# Patient Record
Sex: FEMALE | Race: BLACK OR AFRICAN AMERICAN | NOT HISPANIC OR LATINO | URBAN - METROPOLITAN AREA
[De-identification: names, ages, dates, MRNs, and addresses within clinical notes are randomized per-mention and may not be internally consistent; named-entity substitution may affect disease eponyms.]

---

## 2022-08-08 ENCOUNTER — OUTPATIENT (OUTPATIENT)
Dept: OUTPATIENT SERVICES | Facility: HOSPITAL | Age: 72
LOS: 1 days | End: 2022-08-08
Payer: MEDICAID

## 2022-08-08 DIAGNOSIS — Z01.818 ENCOUNTER FOR OTHER PREPROCEDURAL EXAMINATION: ICD-10-CM

## 2022-08-08 LAB
ALBUMIN SERPL ELPH-MCNC: 3.7 G/DL — SIGNIFICANT CHANGE UP (ref 3.3–5)
ALP SERPL-CCNC: 223 U/L — HIGH (ref 40–120)
ALT FLD-CCNC: 8 U/L — LOW (ref 10–45)
ANION GAP SERPL CALC-SCNC: 13 MMOL/L — SIGNIFICANT CHANGE UP (ref 5–17)
APTT BLD: 28 SEC — SIGNIFICANT CHANGE UP (ref 27.5–35.5)
AST SERPL-CCNC: 12 U/L — SIGNIFICANT CHANGE UP (ref 10–40)
BILIRUB SERPL-MCNC: 0.2 MG/DL — SIGNIFICANT CHANGE UP (ref 0.2–1.2)
BLD GP AB SCN SERPL QL: NEGATIVE — SIGNIFICANT CHANGE UP
BLD GP AB SCN SERPL QL: NEGATIVE — SIGNIFICANT CHANGE UP
BUN SERPL-MCNC: 44 MG/DL — HIGH (ref 7–23)
CALCIUM SERPL-MCNC: 9 MG/DL — SIGNIFICANT CHANGE UP (ref 8.4–10.5)
CHLORIDE SERPL-SCNC: 99 MMOL/L — SIGNIFICANT CHANGE UP (ref 96–108)
CO2 SERPL-SCNC: 28 MMOL/L — SIGNIFICANT CHANGE UP (ref 22–31)
CREAT SERPL-MCNC: 4.58 MG/DL — HIGH (ref 0.5–1.3)
EGFR: 10 ML/MIN/1.73M2 — LOW
GLUCOSE SERPL-MCNC: 184 MG/DL — HIGH (ref 70–99)
HCT VFR BLD CALC: 27 % — LOW (ref 34.5–45)
HGB BLD-MCNC: 8.2 G/DL — LOW (ref 11.5–15.5)
INR BLD: 1.04 — SIGNIFICANT CHANGE UP (ref 0.88–1.16)
MCHC RBC-ENTMCNC: 28.5 PG — SIGNIFICANT CHANGE UP (ref 27–34)
MCHC RBC-ENTMCNC: 30.4 GM/DL — LOW (ref 32–36)
MCV RBC AUTO: 93.8 FL — SIGNIFICANT CHANGE UP (ref 80–100)
NRBC # BLD: 0 /100 WBCS — SIGNIFICANT CHANGE UP (ref 0–0)
PLATELET # BLD AUTO: 198 K/UL — SIGNIFICANT CHANGE UP (ref 150–400)
POTASSIUM SERPL-MCNC: 4.8 MMOL/L — SIGNIFICANT CHANGE UP (ref 3.5–5.3)
POTASSIUM SERPL-SCNC: 4.8 MMOL/L — SIGNIFICANT CHANGE UP (ref 3.5–5.3)
PROT SERPL-MCNC: 6.4 G/DL — SIGNIFICANT CHANGE UP (ref 6–8.3)
PROTHROM AB SERPL-ACNC: 12.4 SEC — SIGNIFICANT CHANGE UP (ref 10.5–13.4)
RBC # BLD: 2.88 M/UL — LOW (ref 3.8–5.2)
RBC # FLD: 16.9 % — HIGH (ref 10.3–14.5)
RH IG SCN BLD-IMP: POSITIVE — SIGNIFICANT CHANGE UP
RH IG SCN BLD-IMP: POSITIVE — SIGNIFICANT CHANGE UP
SODIUM SERPL-SCNC: 140 MMOL/L — SIGNIFICANT CHANGE UP (ref 135–145)
WBC # BLD: 5.77 K/UL — SIGNIFICANT CHANGE UP (ref 3.8–10.5)
WBC # FLD AUTO: 5.77 K/UL — SIGNIFICANT CHANGE UP (ref 3.8–10.5)

## 2022-08-08 PROCEDURE — 85730 THROMBOPLASTIN TIME PARTIAL: CPT

## 2022-08-08 PROCEDURE — 86901 BLOOD TYPING SEROLOGIC RH(D): CPT

## 2022-08-08 PROCEDURE — 86900 BLOOD TYPING SEROLOGIC ABO: CPT

## 2022-08-08 PROCEDURE — 85027 COMPLETE CBC AUTOMATED: CPT

## 2022-08-08 PROCEDURE — 85610 PROTHROMBIN TIME: CPT

## 2022-08-08 PROCEDURE — 86850 RBC ANTIBODY SCREEN: CPT

## 2022-08-08 PROCEDURE — 80053 COMPREHEN METABOLIC PANEL: CPT

## 2022-08-10 RX ORDER — CHLORHEXIDINE GLUCONATE 213 G/1000ML
1 SOLUTION TOPICAL DAILY
Refills: 0 | Status: DISCONTINUED | OUTPATIENT
Start: 2022-08-11 | End: 2022-08-11

## 2022-08-10 NOTE — ASU PATIENT PROFILE, ADULT - NSICDXPASTMEDICALHX_GEN_ALL_CORE_FT
PAST MEDICAL HISTORY:  Angina pectoris     CAD (coronary artery disease)     DM (diabetes mellitus)     ESRD on hemodialysis right chest porth    HTN (hypertension)      PAST MEDICAL HISTORY:  Angina pectoris     AV fistula     CAD (coronary artery disease)     DM (diabetes mellitus)     ESRD on hemodialysis right chest porth    HTN (hypertension)     Seizures

## 2022-08-10 NOTE — ASU PATIENT PROFILE, ADULT - NS PREOP UNDERSTANDS INFO
yes  leave valuables/jewelry/contacts at home, make sure to have escort 18+, bring photo ID, insurance card + covid vax card, no solid foods after midnight, water/apple juice/gatorade until 08:30

## 2022-08-10 NOTE — ASU PATIENT PROFILE, ADULT - FALL HARM RISK - UNIVERSAL INTERVENTIONS
Bed in lowest position, wheels locked, appropriate side rails in place/Call bell, personal items and telephone in reach/Instruct patient to call for assistance before getting out of bed or chair/Non-slip footwear when patient is out of bed/[place] to call system/Physically safe environment - no spills, clutter or unnecessary equipment/Purposeful Proactive Rounding/Room/bathroom lighting operational, light cord in reach

## 2022-08-10 NOTE — ASU PATIENT PROFILE, ADULT - NSICDXPASTSURGICALHX_GEN_ALL_CORE_FT
PAST SURGICAL HISTORY:  AV fistula     Cataract     H/O cervical spine surgery     H/O  section     History of loop recorder     History of thyroid surgery     Status post primary angioplasty

## 2022-08-11 ENCOUNTER — OUTPATIENT (OUTPATIENT)
Dept: OUTPATIENT SERVICES | Facility: HOSPITAL | Age: 72
LOS: 1 days | Discharge: HOME | End: 2022-08-11

## 2022-08-11 VITALS
RESPIRATION RATE: 16 BRPM | DIASTOLIC BLOOD PRESSURE: 51 MMHG | HEIGHT: 63 IN | WEIGHT: 159.39 LBS | TEMPERATURE: 97 F | OXYGEN SATURATION: 100 % | SYSTOLIC BLOOD PRESSURE: 158 MMHG | HEART RATE: 60 BPM

## 2022-08-11 VITALS
SYSTOLIC BLOOD PRESSURE: 132 MMHG | DIASTOLIC BLOOD PRESSURE: 63 MMHG | OXYGEN SATURATION: 100 % | HEART RATE: 60 BPM | RESPIRATION RATE: 16 BRPM

## 2022-08-11 DIAGNOSIS — I77.0 ARTERIOVENOUS FISTULA, ACQUIRED: Chronic | ICD-10-CM

## 2022-08-11 DIAGNOSIS — Z98.890 OTHER SPECIFIED POSTPROCEDURAL STATES: Chronic | ICD-10-CM

## 2022-08-11 DIAGNOSIS — Z98.891 HISTORY OF UTERINE SCAR FROM PREVIOUS SURGERY: Chronic | ICD-10-CM

## 2022-08-11 DIAGNOSIS — H26.9 UNSPECIFIED CATARACT: Chronic | ICD-10-CM

## 2022-08-11 LAB
ALBUMIN SERPL ELPH-MCNC: 3.2 G/DL — LOW (ref 3.3–5)
ALP SERPL-CCNC: 190 U/L — HIGH (ref 40–120)
ALT FLD-CCNC: 15 U/L — SIGNIFICANT CHANGE UP (ref 10–45)
ANION GAP SERPL CALC-SCNC: 13 MMOL/L — SIGNIFICANT CHANGE UP (ref 5–17)
AST SERPL-CCNC: 20 U/L — SIGNIFICANT CHANGE UP (ref 10–40)
BILIRUB SERPL-MCNC: 0.5 MG/DL — SIGNIFICANT CHANGE UP (ref 0.2–1.2)
BUN SERPL-MCNC: 26 MG/DL — HIGH (ref 7–23)
CALCIUM SERPL-MCNC: 9.9 MG/DL — SIGNIFICANT CHANGE UP (ref 8.4–10.5)
CHLORIDE SERPL-SCNC: 98 MMOL/L — SIGNIFICANT CHANGE UP (ref 96–108)
CO2 SERPL-SCNC: 29 MMOL/L — SIGNIFICANT CHANGE UP (ref 22–31)
CREAT SERPL-MCNC: 3.2 MG/DL — HIGH (ref 0.5–1.3)
EGFR: 15 ML/MIN/1.73M2 — LOW
GLUCOSE BLDC GLUCOMTR-MCNC: 98 MG/DL — SIGNIFICANT CHANGE UP (ref 70–99)
GLUCOSE SERPL-MCNC: 141 MG/DL — HIGH (ref 70–99)
POTASSIUM SERPL-MCNC: 4.6 MMOL/L — SIGNIFICANT CHANGE UP (ref 3.5–5.3)
POTASSIUM SERPL-SCNC: 4.6 MMOL/L — SIGNIFICANT CHANGE UP (ref 3.5–5.3)
PROT SERPL-MCNC: 6.4 G/DL — SIGNIFICANT CHANGE UP (ref 6–8.3)
SODIUM SERPL-SCNC: 140 MMOL/L — SIGNIFICANT CHANGE UP (ref 135–145)

## 2022-08-11 DEVICE — CLIP APPLIER ETHICON LIGACLIP 11.5" MEDIUM: Type: IMPLANTABLE DEVICE | Status: FUNCTIONAL

## 2022-08-11 DEVICE — CLIP APPLIER ETHICON LIGACLIP 9 3/8" SMALL: Type: IMPLANTABLE DEVICE | Status: FUNCTIONAL

## 2022-08-11 DEVICE — SURGICEL FIBRILLAR 4 X 4": Type: IMPLANTABLE DEVICE | Status: FUNCTIONAL

## 2022-08-11 RX ORDER — CHLORHEXIDINE GLUCONATE 213 G/1000ML
1 SOLUTION TOPICAL
Qty: 0 | Refills: 0 | DISCHARGE
Start: 2022-08-11

## 2022-08-11 RX ORDER — NIFEDIPINE 30 MG
0 TABLET, EXTENDED RELEASE 24 HR ORAL
Qty: 0 | Refills: 0 | DISCHARGE

## 2022-08-11 RX ORDER — SODIUM CHLORIDE 9 MG/ML
1000 INJECTION INTRAMUSCULAR; INTRAVENOUS; SUBCUTANEOUS
Refills: 0 | Status: DISCONTINUED | OUTPATIENT
Start: 2022-08-11 | End: 2022-08-11

## 2022-08-11 RX ORDER — ACETAMINOPHEN 500 MG
650 TABLET ORAL ONCE
Refills: 0 | Status: DISCONTINUED | OUTPATIENT
Start: 2022-08-11 | End: 2022-08-11

## 2022-08-11 RX ADMIN — CHLORHEXIDINE GLUCONATE 1 APPLICATION(S): 213 SOLUTION TOPICAL at 11:55

## 2022-08-11 RX ADMIN — SODIUM CHLORIDE 3 MILLILITER(S): 9 INJECTION INTRAMUSCULAR; INTRAVENOUS; SUBCUTANEOUS at 18:22

## 2022-08-11 NOTE — ASU DISCHARGE PLAN (ADULT/PEDIATRIC) - ASU DC SPECIAL INSTRUCTIONSFT
1. See Dr. Marquez in the office tomorrow, 12 August 2022.   2. Do NOT take heparin with dialysis.   3. See written instructions as per Dr. Marquez's office.

## 2022-08-11 NOTE — ASU DISCHARGE PLAN (ADULT/PEDIATRIC) - NS MD DC FALL RISK RISK
For information on Fall & Injury Prevention, visit: [email or web address] OR  [email or web address] OR  https://www.cdc.gov/steadi/patient.html

## 2022-08-11 NOTE — PRE-ANESTHESIA EVALUATION ADULT - NSANTHADDINFOFT_GEN_ALL_CORE
Pt accepts all anesthesia risks IBNLT: Dental, Pharyngeal, Laryngeal, Tracheal Trauma, Sore Throat, Hoarseness, Recurrent Laryngeal Nerve Dysfunction, Upper and Lower Extremity Paresthesias, Nausea and Vomiting  For regional pt accepts risk of LA toxicity, Intravascular injection, Intraneuronal injection

## 2022-08-11 NOTE — ASU DISCHARGE PLAN (ADULT/PEDIATRIC) - CARE PROVIDER_API CALL
Alejandro Marquez)  Surgery; Vascular Surgery  [address], [address] [place] [address]  Phone: [phone]  Fax: [phone]  Scheduled Appointment: 08/12/2022

## 2022-08-11 NOTE — PRE-ANESTHESIA EVALUATION ADULT - NSANTHOSAYNRD_GEN_A_CORE
No. [place] screening performed.  STOP BANG Legend: 0-2 = LOW Risk; 3-4 = INTERMEDIATE Risk; 5-8 = HIGH Risk

## 2022-08-11 NOTE — BRIEF OPERATIVE NOTE - OPERATION/FINDINGS
Veins of upper arm mapped with ultrasound showing an adequate basilic vein in the form and a diminutive cephalic vein. Soren test show inadequate vascular inflow to the hand from the ulnar artery but excellent inflow from the radial artery and a palpable radial pulse. Incision was made in the proximal anterolateral left forearm and dissection carried down to the radial and ulnar arteries. The ulnar artery was identified and cleared. A separate incision was then made on the lateral left forearm and the basilic vein identified, taking care to ligate all branches from a ~ 15 cm segment. The distal end of the vein was divided. A tunnel was created between the proximal end of the two incisions and the vein was transposed through this, taking care to avoid twisting by marking the anterior surface. 4000 U of heparin was admiinstered by anesthesia. The tourniquet was then applied to the upper left arm. The ulnar-basilic anastomosis was then created with 7-0 polypropylene suture in a running fashion. The anastomosis was seen to be without leaks after removing the tourniquet. Fibrillar was left in the anastomotic wound and the lateral incision was closed after leaving a 15-Cambodian Hemovac drain in the venous dissection bed. Attention was then turned back to the anastomotic wound, which was closed in layers over a second 15-Cambodian Hemovac drain. Systemic heparin was reversed with 30mg  Protamine. Skin was closed with staples. Telfa and ABD pads were used was a dressing with Kerlix wrap and ACE bandage to secure.

## 2022-10-31 PROBLEM — I77.0 ARTERIOVENOUS FISTULA, ACQUIRED: Chronic | Status: ACTIVE | Noted: 2022-08-11

## 2022-10-31 PROBLEM — I25.10 ATHEROSCLEROTIC HEART DISEASE OF NATIVE CORONARY ARTERY WITHOUT ANGINA PECTORIS: Chronic | Status: ACTIVE | Noted: 2022-08-10

## 2022-10-31 PROBLEM — I10 ESSENTIAL (PRIMARY) HYPERTENSION: Chronic | Status: ACTIVE | Noted: 2022-08-10

## 2022-10-31 PROBLEM — I20.9 ANGINA PECTORIS, UNSPECIFIED: Chronic | Status: ACTIVE | Noted: 2022-08-10

## 2022-11-28 ENCOUNTER — OUTPATIENT (OUTPATIENT)
Dept: OUTPATIENT SERVICES | Facility: HOSPITAL | Age: 72
LOS: 1 days | End: 2022-11-28
Payer: MEDICAID

## 2022-11-28 DIAGNOSIS — Z98.891 HISTORY OF UTERINE SCAR FROM PREVIOUS SURGERY: Chronic | ICD-10-CM

## 2022-11-28 DIAGNOSIS — H26.9 UNSPECIFIED CATARACT: Chronic | ICD-10-CM

## 2022-11-28 DIAGNOSIS — Z98.890 OTHER SPECIFIED POSTPROCEDURAL STATES: Chronic | ICD-10-CM

## 2022-11-28 DIAGNOSIS — Z01.818 ENCOUNTER FOR OTHER PREPROCEDURAL EXAMINATION: ICD-10-CM

## 2022-11-28 DIAGNOSIS — I77.0 ARTERIOVENOUS FISTULA, ACQUIRED: Chronic | ICD-10-CM

## 2022-11-28 LAB
ALBUMIN SERPL ELPH-MCNC: 3.9 G/DL — SIGNIFICANT CHANGE UP (ref 3.3–5)
ALP SERPL-CCNC: 250 U/L — HIGH (ref 40–120)
ALT FLD-CCNC: 11 U/L — SIGNIFICANT CHANGE UP (ref 10–45)
ANION GAP SERPL CALC-SCNC: 11 MMOL/L — SIGNIFICANT CHANGE UP (ref 5–17)
APTT BLD: 25.7 SEC — LOW (ref 27.5–35.5)
AST SERPL-CCNC: 13 U/L — SIGNIFICANT CHANGE UP (ref 10–40)
BILIRUB SERPL-MCNC: 0.2 MG/DL — SIGNIFICANT CHANGE UP (ref 0.2–1.2)
BLD GP AB SCN SERPL QL: NEGATIVE — SIGNIFICANT CHANGE UP
BUN SERPL-MCNC: 62 MG/DL — HIGH (ref 7–23)
CALCIUM SERPL-MCNC: 9.9 MG/DL — SIGNIFICANT CHANGE UP (ref 8.4–10.5)
CHLORIDE SERPL-SCNC: 107 MMOL/L — SIGNIFICANT CHANGE UP (ref 96–108)
CO2 SERPL-SCNC: 30 MMOL/L — SIGNIFICANT CHANGE UP (ref 22–31)
CREAT SERPL-MCNC: 6.47 MG/DL — HIGH (ref 0.5–1.3)
EGFR: 6 ML/MIN/1.73M2 — LOW
GLUCOSE SERPL-MCNC: 144 MG/DL — HIGH (ref 70–99)
HCT VFR BLD CALC: 31.8 % — LOW (ref 34.5–45)
HGB BLD-MCNC: 9.7 G/DL — LOW (ref 11.5–15.5)
INR BLD: 0.97 — SIGNIFICANT CHANGE UP (ref 0.88–1.16)
MCHC RBC-ENTMCNC: 28 PG — SIGNIFICANT CHANGE UP (ref 27–34)
MCHC RBC-ENTMCNC: 30.5 GM/DL — LOW (ref 32–36)
MCV RBC AUTO: 91.9 FL — SIGNIFICANT CHANGE UP (ref 80–100)
NRBC # BLD: 0 /100 WBCS — SIGNIFICANT CHANGE UP (ref 0–0)
PLATELET # BLD AUTO: 133 K/UL — LOW (ref 150–400)
POTASSIUM SERPL-MCNC: 4.6 MMOL/L — SIGNIFICANT CHANGE UP (ref 3.5–5.3)
POTASSIUM SERPL-SCNC: 4.6 MMOL/L — SIGNIFICANT CHANGE UP (ref 3.5–5.3)
PROT SERPL-MCNC: 6.3 G/DL — SIGNIFICANT CHANGE UP (ref 6–8.3)
PROTHROM AB SERPL-ACNC: 11.5 SEC — SIGNIFICANT CHANGE UP (ref 10.5–13.4)
RBC # BLD: 3.46 M/UL — LOW (ref 3.8–5.2)
RBC # FLD: 17.6 % — HIGH (ref 10.3–14.5)
RH IG SCN BLD-IMP: POSITIVE — SIGNIFICANT CHANGE UP
SODIUM SERPL-SCNC: 148 MMOL/L — HIGH (ref 135–145)
WBC # BLD: 5.29 K/UL — SIGNIFICANT CHANGE UP (ref 3.8–10.5)
WBC # FLD AUTO: 5.29 K/UL — SIGNIFICANT CHANGE UP (ref 3.8–10.5)

## 2022-11-28 PROCEDURE — 93005 ELECTROCARDIOGRAM TRACING: CPT

## 2022-11-28 PROCEDURE — 80053 COMPREHEN METABOLIC PANEL: CPT

## 2022-11-28 PROCEDURE — 85730 THROMBOPLASTIN TIME PARTIAL: CPT

## 2022-11-28 PROCEDURE — 85610 PROTHROMBIN TIME: CPT

## 2022-11-28 PROCEDURE — 86900 BLOOD TYPING SEROLOGIC ABO: CPT

## 2022-11-28 PROCEDURE — 85027 COMPLETE CBC AUTOMATED: CPT

## 2022-11-28 PROCEDURE — 86901 BLOOD TYPING SEROLOGIC RH(D): CPT

## 2022-11-28 PROCEDURE — 93010 ELECTROCARDIOGRAM REPORT: CPT

## 2022-11-28 PROCEDURE — 86850 RBC ANTIBODY SCREEN: CPT

## 2022-11-30 VITALS
SYSTOLIC BLOOD PRESSURE: 131 MMHG | HEIGHT: 63 IN | WEIGHT: 168.43 LBS | OXYGEN SATURATION: 100 % | HEART RATE: 64 BPM | RESPIRATION RATE: 16 BRPM | TEMPERATURE: 98 F | DIASTOLIC BLOOD PRESSURE: 100 MMHG

## 2022-11-30 RX ORDER — CARVEDILOL PHOSPHATE 80 MG/1
1 CAPSULE, EXTENDED RELEASE ORAL
Qty: 0 | Refills: 0 | DISCHARGE

## 2022-11-30 RX ORDER — ROSUVASTATIN CALCIUM 5 MG/1
1 TABLET ORAL
Qty: 0 | Refills: 0 | DISCHARGE

## 2022-11-30 RX ORDER — INSULIN GLARGINE 100 [IU]/ML
0 INJECTION, SOLUTION SUBCUTANEOUS
Qty: 0 | Refills: 0 | DISCHARGE

## 2022-11-30 RX ORDER — SEVELAMER CARBONATE 2400 MG/1
1 POWDER, FOR SUSPENSION ORAL
Qty: 0 | Refills: 0 | DISCHARGE

## 2022-11-30 RX ORDER — ARIPIPRAZOLE 15 MG/1
1 TABLET ORAL
Qty: 0 | Refills: 0 | DISCHARGE

## 2022-11-30 NOTE — ASU PATIENT PROFILE, ADULT - FALL HARM RISK - RISK INTERVENTIONS

## 2022-11-30 NOTE — ASU PATIENT PROFILE, ADULT - NSICDXPASTSURGICALHX_GEN_ALL_CORE_FT
PAST SURGICAL HISTORY:  AV fistula     Cataract     Elective surgery coronary stent x5    H/O cervical spine surgery     H/O  section x3    History of loop recorder     History of thyroid surgery      PAST SURGICAL HISTORY:  AV fistula     Cataract     Elective surgery coronary stent x5    H/O cervical spine surgery     H/O  section x3    History of loop recorder removed    History of thyroid surgery

## 2022-11-30 NOTE — ASU PATIENT PROFILE, ADULT - NSICDXPASTMEDICALHX_GEN_ALL_CORE_FT
PAST MEDICAL HISTORY:  Anemia     Angina pectoris     AV fistula     CAD (coronary artery disease)     DM (diabetes mellitus) type 2    ESRD on hemodialysis right chest port    Gout     H/O carpal tunnel syndrome     H/O hyperthyroidism     HTN (hypertension)     OA (osteoarthritis)     Obesity     Seizures      PAST MEDICAL HISTORY:  Anemia     Angina pectoris     AV fistula     CAD (coronary artery disease)     DM (diabetes mellitus) type 2    ESRD on hemodialysis right chest port    Gout     H/O carpal tunnel syndrome     H/O hyperthyroidism     HTN (hypertension)     OA (osteoarthritis)     Obesity     Seizures 10 y ago, las t episode

## 2022-12-01 ENCOUNTER — OUTPATIENT (OUTPATIENT)
Dept: OUTPATIENT SERVICES | Facility: HOSPITAL | Age: 72
LOS: 1 days | Discharge: HOME | End: 2022-12-01
Payer: MEDICAID

## 2022-12-01 VITALS
OXYGEN SATURATION: 99 % | RESPIRATION RATE: 16 BRPM | TEMPERATURE: 97 F | SYSTOLIC BLOOD PRESSURE: 128 MMHG | HEART RATE: 62 BPM | DIASTOLIC BLOOD PRESSURE: 69 MMHG

## 2022-12-01 DIAGNOSIS — I77.0 ARTERIOVENOUS FISTULA, ACQUIRED: Chronic | ICD-10-CM

## 2022-12-01 DIAGNOSIS — Z98.890 OTHER SPECIFIED POSTPROCEDURAL STATES: Chronic | ICD-10-CM

## 2022-12-01 DIAGNOSIS — H26.9 UNSPECIFIED CATARACT: Chronic | ICD-10-CM

## 2022-12-01 DIAGNOSIS — Z98.891 HISTORY OF UTERINE SCAR FROM PREVIOUS SURGERY: Chronic | ICD-10-CM

## 2022-12-01 DIAGNOSIS — Z41.9 ENCOUNTER FOR PROCEDURE FOR PURPOSES OTHER THAN REMEDYING HEALTH STATE, UNSPECIFIED: Chronic | ICD-10-CM

## 2022-12-01 LAB
GLUCOSE BLDC GLUCOMTR-MCNC: 140 MG/DL — HIGH (ref 70–99)
ISTAT VENOUS BE: 3 MMOL/L — SIGNIFICANT CHANGE UP (ref -2–3)
ISTAT VENOUS GLUCOSE: 144 MG/DL — HIGH (ref 70–99)
ISTAT VENOUS HCO3: 29 MMOL/L — HIGH (ref 23–28)
ISTAT VENOUS HEMATOCRIT: 32 % — LOW (ref 34.5–45)
ISTAT VENOUS HEMOGLOBIN: 10.9 GM/DL — LOW (ref 11.5–15.5)
ISTAT VENOUS IONIZED CALCIUM: 1.3 MMOL/L — SIGNIFICANT CHANGE UP (ref 1.12–1.3)
ISTAT VENOUS PCO2: 52 MMHG — HIGH (ref 41–51)
ISTAT VENOUS PH: 7.36 — SIGNIFICANT CHANGE UP (ref 7.31–7.41)
ISTAT VENOUS PO2: <66 MMHG — LOW (ref 35–40)
ISTAT VENOUS POTASSIUM: 4.5 MMOL/L — SIGNIFICANT CHANGE UP (ref 3.5–5.3)
ISTAT VENOUS SO2: 61 % — SIGNIFICANT CHANGE UP
ISTAT VENOUS SODIUM: 137 MMOL/L — SIGNIFICANT CHANGE UP (ref 135–145)
ISTAT VENOUS TCO2: 31 MMOL/L — SIGNIFICANT CHANGE UP (ref 22–31)

## 2022-12-01 PROCEDURE — 84132 ASSAY OF SERUM POTASSIUM: CPT

## 2022-12-01 PROCEDURE — 82962 GLUCOSE BLOOD TEST: CPT

## 2022-12-01 PROCEDURE — C1889: CPT

## 2022-12-01 PROCEDURE — 36832 AV FISTULA REVISION OPEN: CPT

## 2022-12-01 DEVICE — CLIP APPLIER ETHICON LIGACLIP 9 3/8" SMALL: Type: IMPLANTABLE DEVICE | Status: FUNCTIONAL

## 2022-12-01 DEVICE — CLIP APPLIER ETHICON LIGACLIP 11.5" MEDIUM: Type: IMPLANTABLE DEVICE | Status: FUNCTIONAL

## 2022-12-01 DEVICE — SURGICEL FIBRILLAR 4 X 4": Type: IMPLANTABLE DEVICE | Status: FUNCTIONAL

## 2022-12-01 NOTE — ASU DISCHARGE PLAN (ADULT/PEDIATRIC) - ASU DC SPECIAL INSTRUCTIONSFT
Be sure to change dressings over incision once daily, or more frequently if soiled, starting the day after surgery.    Clean incision thoroughly once daily with Peroxide and Betadine solution. These can be purchased in your local pharmacy.     Keep incision dry. After one week, you may shower, making sure incision is thoroughly dry afterward. Do not soak in bathtubs or hot tubs!    You will receive a prescription for pain medication from Dr. Marquez. You may take this medication on as-needed basis for pain. Please follow the instructions on the prescription    You may resume Aspirin, Plavix, and/or coumadin medications after surgery as directed    If you are currently on hemodialysis, please notify your dialysis center nurse or technician that you have had surgery and should not received heparin for one week following surgery    Please contact Dr. Marquez's office if you develop fever, redness, and/or drainage from the incision site. Please note that a small amount of drainage is normal, and should stop 1-2 days after surgery    Please contact Dr. Marquez if you develop pain, numbness, or weakness in your hands or fingers    Please call for a follow up appointment with the physicians assistant (PA) at Dr. Marquez's office in 1 day, for evaluation and suture/staples removal as well as hemovac removal.

## 2022-12-01 NOTE — BRIEF OPERATIVE NOTE - OPERATION/FINDINGS
Tortorous, and atherosclerosed venous component of AV fistula. Occlusion noted at proximal 1/3 of forearm. Vessel freed from surrounding soft tissue, reoriented through tunnel. 1cm of vessel excised and re-ligated. Soft tissue reapproximated, skin closure with staples. L revision AV fistula. Tortorous, and atherosclerosed venous component of AV fistula. Occlusion noted at proximal 1/3 of forearm. Vessel freed from surrounding soft tissue, reoriented through tunnel. 1cm of vessel excised and re-ligated. Soft tissue reapproximated, skin closure with staples.

## 2022-12-01 NOTE — PRE-ANESTHESIA EVALUATION ADULT - NSANTHOBSERVEDRD_ENT_A_CORE
CM met with the patient for discharge planning. Patient lives at home alone, has insurance with Rx coverage & PCP, stated that he was independent with ADL's prior to admission, and does not drive (uses [facility] or friends for transportation). Patient stated that he uses a cane at home and does not require home oxygen but does use an inhaler. Patient plans to return home upon discharge. Patient stated that he will need transportation home and normally uses [facility]. Patient is unable to identify any other needs at this time. CM available if needs arise. No

## 2022-12-01 NOTE — PRE-ANESTHESIA EVALUATION ADULT - NSATTENDATTESTRD_GEN_ALL_CORE
English
The patient has been re-examined and I agree with the above assessment or I updated with my findings.

## 2022-12-01 NOTE — ASU DISCHARGE PLAN (ADULT/PEDIATRIC) - NS MD DC FALL RISK RISK
For information on Fall & Injury Prevention, visit: [email or web address] OR  [email or web address] OR  https://www.cdc.gov/steadi/patient.html

## 2022-12-01 NOTE — ASU DISCHARGE PLAN (ADULT/PEDIATRIC) - CARE PROVIDER_API CALL
Alejandro Marquez)  Surgery; Vascular Surgery  [address] [facility] [facility], [address] [place] [address]  Phone: [phone]  Fax: [phone]  Established Patient  Follow Up Time: 1-3 days

## 2023-03-08 ENCOUNTER — INPATIENT (INPATIENT)
Facility: HOSPITAL | Age: 73
LOS: 0 days | Discharge: HOME HEALTH | DRG: 314 | End: 2023-03-09
Attending: STUDENT IN AN ORGANIZED HEALTH CARE EDUCATION/TRAINING PROGRAM | Admitting: STUDENT IN AN ORGANIZED HEALTH CARE EDUCATION/TRAINING PROGRAM
Payer: MEDICAID

## 2023-03-08 VITALS
SYSTOLIC BLOOD PRESSURE: 102 MMHG | DIASTOLIC BLOOD PRESSURE: 57 MMHG | TEMPERATURE: 98 F | WEIGHT: 164.91 LBS | OXYGEN SATURATION: 95 % | HEIGHT: 63 IN | RESPIRATION RATE: 16 BRPM | HEART RATE: 90 BPM

## 2023-03-08 DIAGNOSIS — Z98.890 OTHER SPECIFIED POSTPROCEDURAL STATES: Chronic | ICD-10-CM

## 2023-03-08 DIAGNOSIS — I10 ESSENTIAL (PRIMARY) HYPERTENSION: ICD-10-CM

## 2023-03-08 DIAGNOSIS — Z98.891 HISTORY OF UTERINE SCAR FROM PREVIOUS SURGERY: Chronic | ICD-10-CM

## 2023-03-08 DIAGNOSIS — E11.9 TYPE 2 DIABETES MELLITUS WITHOUT COMPLICATIONS: ICD-10-CM

## 2023-03-08 DIAGNOSIS — N39.0 URINARY TRACT INFECTION, SITE NOT SPECIFIED: ICD-10-CM

## 2023-03-08 DIAGNOSIS — Z41.9 ENCOUNTER FOR PROCEDURE FOR PURPOSES OTHER THAN REMEDYING HEALTH STATE, UNSPECIFIED: Chronic | ICD-10-CM

## 2023-03-08 DIAGNOSIS — H26.9 UNSPECIFIED CATARACT: Chronic | ICD-10-CM

## 2023-03-08 DIAGNOSIS — I95.9 HYPOTENSION, UNSPECIFIED: ICD-10-CM

## 2023-03-08 DIAGNOSIS — G40.909 EPILEPSY, UNSPECIFIED, NOT INTRACTABLE, WITHOUT STATUS EPILEPTICUS: ICD-10-CM

## 2023-03-08 DIAGNOSIS — I25.10 ATHEROSCLEROTIC HEART DISEASE OF NATIVE CORONARY ARTERY WITHOUT ANGINA PECTORIS: ICD-10-CM

## 2023-03-08 DIAGNOSIS — I77.0 ARTERIOVENOUS FISTULA, ACQUIRED: Chronic | ICD-10-CM

## 2023-03-08 DIAGNOSIS — Z29.9 ENCOUNTER FOR PROPHYLACTIC MEASURES, UNSPECIFIED: ICD-10-CM

## 2023-03-08 DIAGNOSIS — N18.6 END STAGE RENAL DISEASE: ICD-10-CM

## 2023-03-08 PROBLEM — Z86.39 PERSONAL HISTORY OF OTHER ENDOCRINE, NUTRITIONAL AND METABOLIC DISEASE: Chronic | Status: ACTIVE | Noted: 2022-11-30

## 2023-03-08 PROBLEM — M19.90 UNSPECIFIED OSTEOARTHRITIS, UNSPECIFIED SITE: Chronic | Status: ACTIVE | Noted: 2022-11-30

## 2023-03-08 PROBLEM — D64.9 ANEMIA, UNSPECIFIED: Chronic | Status: ACTIVE | Noted: 2022-11-30

## 2023-03-08 PROBLEM — Z86.69 PERSONAL HISTORY OF OTHER DISEASES OF THE NERVOUS SYSTEM AND SENSE ORGANS: Chronic | Status: ACTIVE | Noted: 2022-11-30

## 2023-03-08 PROBLEM — M10.9 GOUT, UNSPECIFIED: Chronic | Status: ACTIVE | Noted: 2022-11-30

## 2023-03-08 PROBLEM — R56.9 UNSPECIFIED CONVULSIONS: Chronic | Status: ACTIVE | Noted: 2022-08-11

## 2023-03-08 PROBLEM — E66.9 OBESITY, UNSPECIFIED: Chronic | Status: ACTIVE | Noted: 2022-11-30

## 2023-03-08 LAB
ALBUMIN SERPL ELPH-MCNC: 2.7 G/DL — LOW (ref 3.3–5)
ALP SERPL-CCNC: 150 U/L — HIGH (ref 40–120)
ALT FLD-CCNC: 9 U/L — LOW (ref 10–45)
AMPHET UR-MCNC: NEGATIVE — SIGNIFICANT CHANGE UP
ANION GAP SERPL CALC-SCNC: 12 MMOL/L — SIGNIFICANT CHANGE UP (ref 5–17)
APPEARANCE UR: ABNORMAL
AST SERPL-CCNC: 15 U/L — SIGNIFICANT CHANGE UP (ref 10–40)
BACTERIA # UR AUTO: PRESENT /HPF
BARBITURATES UR SCN-MCNC: NEGATIVE — SIGNIFICANT CHANGE UP
BASOPHILS # BLD AUTO: 0.02 K/UL — SIGNIFICANT CHANGE UP (ref 0–0.2)
BASOPHILS NFR BLD AUTO: 0.3 % — SIGNIFICANT CHANGE UP (ref 0–2)
BENZODIAZ UR-MCNC: NEGATIVE — SIGNIFICANT CHANGE UP
BILIRUB SERPL-MCNC: <0.2 MG/DL — SIGNIFICANT CHANGE UP (ref 0.2–1.2)
BILIRUB UR-MCNC: NEGATIVE — SIGNIFICANT CHANGE UP
BUN SERPL-MCNC: 44 MG/DL — HIGH (ref 7–23)
CALCIUM SERPL-MCNC: 7.4 MG/DL — LOW (ref 8.4–10.5)
CHLORIDE SERPL-SCNC: 110 MMOL/L — HIGH (ref 96–108)
CO2 SERPL-SCNC: 20 MMOL/L — LOW (ref 22–31)
COCAINE METAB.OTHER UR-MCNC: NEGATIVE — SIGNIFICANT CHANGE UP
COLOR SPEC: YELLOW — SIGNIFICANT CHANGE UP
COMMENT - URINE: SIGNIFICANT CHANGE UP
CREAT SERPL-MCNC: 4.49 MG/DL — HIGH (ref 0.5–1.3)
DIFF PNL FLD: ABNORMAL
EGFR: 10 ML/MIN/1.73M2 — LOW
EOSINOPHIL # BLD AUTO: 0.01 K/UL — SIGNIFICANT CHANGE UP (ref 0–0.5)
EOSINOPHIL NFR BLD AUTO: 0.1 % — SIGNIFICANT CHANGE UP (ref 0–6)
EPI CELLS # UR: SIGNIFICANT CHANGE UP /HPF (ref 0–5)
GLUCOSE BLDC GLUCOMTR-MCNC: 172 MG/DL — HIGH (ref 70–99)
GLUCOSE SERPL-MCNC: 182 MG/DL — HIGH (ref 70–99)
GLUCOSE UR QL: NEGATIVE — SIGNIFICANT CHANGE UP
HCT VFR BLD CALC: 29.6 % — LOW (ref 34.5–45)
HGB BLD-MCNC: 8.8 G/DL — LOW (ref 11.5–15.5)
IMM GRANULOCYTES NFR BLD AUTO: 0.4 % — SIGNIFICANT CHANGE UP (ref 0–0.9)
KETONES UR-MCNC: ABNORMAL MG/DL
LACTATE SERPL-SCNC: 1.8 MMOL/L — SIGNIFICANT CHANGE UP (ref 0.5–2)
LEUKOCYTE ESTERASE UR-ACNC: ABNORMAL
LYMPHOCYTES # BLD AUTO: 1.16 K/UL — SIGNIFICANT CHANGE UP (ref 1–3.3)
LYMPHOCYTES # BLD AUTO: 15.3 % — SIGNIFICANT CHANGE UP (ref 13–44)
MCHC RBC-ENTMCNC: 29.1 PG — SIGNIFICANT CHANGE UP (ref 27–34)
MCHC RBC-ENTMCNC: 29.7 GM/DL — LOW (ref 32–36)
MCV RBC AUTO: 98 FL — SIGNIFICANT CHANGE UP (ref 80–100)
METHADONE UR-MCNC: NEGATIVE — SIGNIFICANT CHANGE UP
MONOCYTES # BLD AUTO: 0.76 K/UL — SIGNIFICANT CHANGE UP (ref 0–0.9)
MONOCYTES NFR BLD AUTO: 10 % — SIGNIFICANT CHANGE UP (ref 2–14)
NEUTROPHILS # BLD AUTO: 5.59 K/UL — SIGNIFICANT CHANGE UP (ref 1.8–7.4)
NEUTROPHILS NFR BLD AUTO: 73.9 % — SIGNIFICANT CHANGE UP (ref 43–77)
NITRITE UR-MCNC: NEGATIVE — SIGNIFICANT CHANGE UP
NRBC # BLD: 0 /100 WBCS — SIGNIFICANT CHANGE UP (ref 0–0)
OPIATES UR-MCNC: NEGATIVE — SIGNIFICANT CHANGE UP
PCP SPEC-MCNC: SIGNIFICANT CHANGE UP
PCP UR-MCNC: NEGATIVE — SIGNIFICANT CHANGE UP
PH UR: 5 — SIGNIFICANT CHANGE UP (ref 5–8)
PLATELET # BLD AUTO: 187 K/UL — SIGNIFICANT CHANGE UP (ref 150–400)
POTASSIUM SERPL-MCNC: 3.5 MMOL/L — SIGNIFICANT CHANGE UP (ref 3.5–5.3)
POTASSIUM SERPL-SCNC: 3.5 MMOL/L — SIGNIFICANT CHANGE UP (ref 3.5–5.3)
PROT SERPL-MCNC: 4.6 G/DL — LOW (ref 6–8.3)
PROT UR-MCNC: 100 MG/DL
RBC # BLD: 3.02 M/UL — LOW (ref 3.8–5.2)
RBC # FLD: 17.9 % — HIGH (ref 10.3–14.5)
RBC CASTS # UR COMP ASSIST: > 10 /HPF
SARS-COV-2 RNA SPEC QL NAA+PROBE: SIGNIFICANT CHANGE UP
SODIUM SERPL-SCNC: 142 MMOL/L — SIGNIFICANT CHANGE UP (ref 135–145)
SP GR SPEC: 1.02 — SIGNIFICANT CHANGE UP (ref 1–1.03)
THC UR QL: NEGATIVE — SIGNIFICANT CHANGE UP
UROBILINOGEN FLD QL: 0.2 E.U./DL — SIGNIFICANT CHANGE UP
WBC # BLD: 7.57 K/UL — SIGNIFICANT CHANGE UP (ref 3.8–10.5)
WBC # FLD AUTO: 7.57 K/UL — SIGNIFICANT CHANGE UP (ref 3.8–10.5)
WBC UR QL: ABNORMAL /HPF

## 2023-03-08 PROCEDURE — 99223 1ST HOSP IP/OBS HIGH 75: CPT

## 2023-03-08 PROCEDURE — 99285 EMERGENCY DEPT VISIT HI MDM: CPT

## 2023-03-08 PROCEDURE — 71045 X-RAY EXAM CHEST 1 VIEW: CPT | Mod: 26

## 2023-03-08 RX ORDER — GABAPENTIN 400 MG/1
1 CAPSULE ORAL
Qty: 0 | Refills: 0 | DISCHARGE

## 2023-03-08 RX ORDER — ISOSORBIDE MONONITRATE 60 MG/1
1 TABLET, EXTENDED RELEASE ORAL
Qty: 0 | Refills: 0 | DISCHARGE

## 2023-03-08 RX ORDER — INSULIN ASPART 100 [IU]/ML
0 INJECTION, SOLUTION SUBCUTANEOUS
Qty: 0 | Refills: 0 | DISCHARGE

## 2023-03-08 RX ORDER — OXYCODONE HYDROCHLORIDE 5 MG/1
1 TABLET ORAL
Qty: 0 | Refills: 0 | DISCHARGE

## 2023-03-08 RX ORDER — CARVEDILOL PHOSPHATE 80 MG/1
1 CAPSULE, EXTENDED RELEASE ORAL
Qty: 0 | Refills: 0 | DISCHARGE

## 2023-03-08 RX ORDER — SENNA PLUS 8.6 MG/1
2 TABLET ORAL
Qty: 0 | Refills: 0 | DISCHARGE

## 2023-03-08 RX ORDER — PANTOPRAZOLE SODIUM 20 MG/1
40 TABLET, DELAYED RELEASE ORAL
Refills: 0 | Status: DISCONTINUED | OUTPATIENT
Start: 2023-03-08 | End: 2023-03-09

## 2023-03-08 RX ORDER — ATORVASTATIN CALCIUM 80 MG/1
1 TABLET, FILM COATED ORAL
Qty: 0 | Refills: 0 | DISCHARGE

## 2023-03-08 RX ORDER — INSULIN GLARGINE 100 [IU]/ML
10 INJECTION, SOLUTION SUBCUTANEOUS AT BEDTIME
Refills: 0 | Status: DISCONTINUED | OUTPATIENT
Start: 2023-03-08 | End: 2023-03-09

## 2023-03-08 RX ORDER — DIVALPROEX SODIUM 500 MG/1
250 TABLET, DELAYED RELEASE ORAL EVERY 12 HOURS
Refills: 0 | Status: DISCONTINUED | OUTPATIENT
Start: 2023-03-09 | End: 2023-03-09

## 2023-03-08 RX ORDER — NITROGLYCERIN 6.5 MG
1 CAPSULE, EXTENDED RELEASE ORAL
Qty: 0 | Refills: 0 | DISCHARGE

## 2023-03-08 RX ORDER — CEFTRIAXONE 500 MG/1
1000 INJECTION, POWDER, FOR SOLUTION INTRAMUSCULAR; INTRAVENOUS EVERY 24 HOURS
Refills: 0 | Status: DISCONTINUED | OUTPATIENT
Start: 2023-03-09 | End: 2023-03-09

## 2023-03-08 RX ORDER — DEXTROSE 50 % IN WATER 50 %
12.5 SYRINGE (ML) INTRAVENOUS ONCE
Refills: 0 | Status: DISCONTINUED | OUTPATIENT
Start: 2023-03-08 | End: 2023-03-09

## 2023-03-08 RX ORDER — INSULIN LISPRO 100/ML
VIAL (ML) SUBCUTANEOUS
Refills: 0 | Status: DISCONTINUED | OUTPATIENT
Start: 2023-03-08 | End: 2023-03-09

## 2023-03-08 RX ORDER — ARIPIPRAZOLE 15 MG/1
2 TABLET ORAL DAILY
Refills: 0 | Status: DISCONTINUED | OUTPATIENT
Start: 2023-03-09 | End: 2023-03-09

## 2023-03-08 RX ORDER — NIFEDIPINE 30 MG
1 TABLET, EXTENDED RELEASE 24 HR ORAL
Qty: 0 | Refills: 0 | DISCHARGE

## 2023-03-08 RX ORDER — HEPARIN SODIUM 5000 [USP'U]/ML
5000 INJECTION INTRAVENOUS; SUBCUTANEOUS EVERY 8 HOURS
Refills: 0 | Status: DISCONTINUED | OUTPATIENT
Start: 2023-03-08 | End: 2023-03-09

## 2023-03-08 RX ORDER — DEXTROSE 50 % IN WATER 50 %
25 SYRINGE (ML) INTRAVENOUS ONCE
Refills: 0 | Status: DISCONTINUED | OUTPATIENT
Start: 2023-03-08 | End: 2023-03-09

## 2023-03-08 RX ORDER — ALLOPURINOL 300 MG
0 TABLET ORAL
Qty: 0 | Refills: 0 | DISCHARGE

## 2023-03-08 RX ORDER — TRAZODONE HCL 50 MG
100 TABLET ORAL
Qty: 0 | Refills: 0 | DISCHARGE

## 2023-03-08 RX ORDER — SENNA PLUS 8.6 MG/1
1 TABLET ORAL
Qty: 0 | Refills: 0 | DISCHARGE

## 2023-03-08 RX ORDER — ASPIRIN/CALCIUM CARB/MAGNESIUM 324 MG
0 TABLET ORAL
Qty: 0 | Refills: 0 | DISCHARGE

## 2023-03-08 RX ORDER — GLUCAGON INJECTION, SOLUTION 0.5 MG/.1ML
1 INJECTION, SOLUTION SUBCUTANEOUS ONCE
Refills: 0 | Status: DISCONTINUED | OUTPATIENT
Start: 2023-03-08 | End: 2023-03-09

## 2023-03-08 RX ORDER — ROSUVASTATIN CALCIUM 5 MG/1
1 TABLET ORAL
Qty: 0 | Refills: 0 | DISCHARGE

## 2023-03-08 RX ORDER — INSULIN LISPRO 100/ML
VIAL (ML) SUBCUTANEOUS AT BEDTIME
Refills: 0 | Status: DISCONTINUED | OUTPATIENT
Start: 2023-03-08 | End: 2023-03-09

## 2023-03-08 RX ORDER — HYDRALAZINE HCL 50 MG
0 TABLET ORAL
Qty: 0 | Refills: 0 | DISCHARGE

## 2023-03-08 RX ORDER — ASCORBIC ACID 60 MG
1 TABLET,CHEWABLE ORAL
Qty: 0 | Refills: 0 | DISCHARGE

## 2023-03-08 RX ORDER — CINACALCET 30 MG/1
1 TABLET, FILM COATED ORAL
Qty: 0 | Refills: 0 | DISCHARGE

## 2023-03-08 RX ORDER — SODIUM CHLORIDE 9 MG/ML
1000 INJECTION INTRAMUSCULAR; INTRAVENOUS; SUBCUTANEOUS ONCE
Refills: 0 | Status: COMPLETED | OUTPATIENT
Start: 2023-03-08 | End: 2023-03-08

## 2023-03-08 RX ORDER — CHOLECALCIFEROL (VITAMIN D3) 125 MCG
1 CAPSULE ORAL
Qty: 0 | Refills: 0 | DISCHARGE

## 2023-03-08 RX ORDER — METOPROLOL TARTRATE 50 MG
1 TABLET ORAL
Qty: 0 | Refills: 0 | DISCHARGE

## 2023-03-08 RX ORDER — FERROUS SULFATE 325(65) MG
0 TABLET ORAL
Qty: 0 | Refills: 0 | DISCHARGE

## 2023-03-08 RX ORDER — ASPIRIN/CALCIUM CARB/MAGNESIUM 324 MG
81 TABLET ORAL DAILY
Refills: 0 | Status: DISCONTINUED | OUTPATIENT
Start: 2023-03-08 | End: 2023-03-09

## 2023-03-08 RX ORDER — DIVALPROEX SODIUM 500 MG/1
1 TABLET, DELAYED RELEASE ORAL
Qty: 0 | Refills: 0 | DISCHARGE

## 2023-03-08 RX ORDER — DEXTROSE 50 % IN WATER 50 %
15 SYRINGE (ML) INTRAVENOUS ONCE
Refills: 0 | Status: DISCONTINUED | OUTPATIENT
Start: 2023-03-08 | End: 2023-03-09

## 2023-03-08 RX ORDER — ALLOPURINOL 300 MG
1 TABLET ORAL
Qty: 0 | Refills: 0 | DISCHARGE

## 2023-03-08 RX ORDER — OLOPATADINE HYDROCHLORIDE 1 MG/ML
1 SOLUTION/ DROPS OPHTHALMIC
Qty: 0 | Refills: 0 | DISCHARGE

## 2023-03-08 RX ORDER — ARIPIPRAZOLE 15 MG/1
1 TABLET ORAL
Qty: 0 | Refills: 0 | DISCHARGE

## 2023-03-08 RX ORDER — CEFTRIAXONE 500 MG/1
1000 INJECTION, POWDER, FOR SOLUTION INTRAMUSCULAR; INTRAVENOUS ONCE
Refills: 0 | Status: COMPLETED | OUTPATIENT
Start: 2023-03-08 | End: 2023-03-08

## 2023-03-08 RX ORDER — SEVELAMER CARBONATE 2400 MG/1
0 POWDER, FOR SUSPENSION ORAL
Qty: 0 | Refills: 0 | DISCHARGE

## 2023-03-08 RX ORDER — OMEPRAZOLE 10 MG/1
1 CAPSULE, DELAYED RELEASE ORAL
Qty: 0 | Refills: 0 | DISCHARGE

## 2023-03-08 RX ORDER — SODIUM CHLORIDE 9 MG/ML
1000 INJECTION, SOLUTION INTRAVENOUS
Refills: 0 | Status: DISCONTINUED | OUTPATIENT
Start: 2023-03-08 | End: 2023-03-09

## 2023-03-08 RX ORDER — SODIUM CHLORIDE 9 MG/ML
500 INJECTION INTRAMUSCULAR; INTRAVENOUS; SUBCUTANEOUS ONCE
Refills: 0 | Status: COMPLETED | OUTPATIENT
Start: 2023-03-08 | End: 2023-03-08

## 2023-03-08 RX ORDER — ASPIRIN/CALCIUM CARB/MAGNESIUM 324 MG
1 TABLET ORAL
Qty: 0 | Refills: 0 | DISCHARGE

## 2023-03-08 RX ORDER — PANTOPRAZOLE SODIUM 20 MG/1
1 TABLET, DELAYED RELEASE ORAL
Qty: 0 | Refills: 0 | DISCHARGE

## 2023-03-08 RX ADMIN — SODIUM CHLORIDE 500 MILLILITER(S): 9 INJECTION INTRAMUSCULAR; INTRAVENOUS; SUBCUTANEOUS at 16:46

## 2023-03-08 RX ADMIN — CEFTRIAXONE 100 MILLIGRAM(S): 500 INJECTION, POWDER, FOR SOLUTION INTRAMUSCULAR; INTRAVENOUS at 17:26

## 2023-03-08 RX ADMIN — SODIUM CHLORIDE 1000 MILLILITER(S): 9 INJECTION INTRAMUSCULAR; INTRAVENOUS; SUBCUTANEOUS at 17:27

## 2023-03-08 NOTE — H&P ADULT - NSHPSOCIALHISTORY_GEN_ALL_CORE
Lives alone in [place] with [facility]HA 7 days/week, 6 hours/day. Pt would like additional HHA hours. Pt has grandson who lives with her Thursday-Sunday.

## 2023-03-08 NOTE — H&P ADULT - HISTORY OF PRESENT ILLNESS
SIMI MATIAS [id number] is a 73 yo Female with PMH of seizure disorder, ESRD on HD MWF (last dialysis on Monday), CAD s/p 5 stents (last stent in 2017), HTN, and DM on insulin who presents to the ED with 1 day of fatigue. Pt reports that the day prior to admission, she underwent minor AV fistula revision surgery (AVF initially placed in LUE August 2022, revised in December 2022) under local anesthesia. On day of admission, pt awoke in usual state of health, cooked breakfast, and began to feel fatigued and tired while she was getting ready to go to her follow-up appointment to get stitches removed. During her follow-up appointment, she was noted to have low blood pressure and was instructed to go to the ED. Pt additionally reported increased urinary frequency over the past few days, but otherwise denied any fever, chills, HA, dizziness, CP, SOB, palpitations, abdominal pain, n/v, diarrhea, constipation, dysuria, hematuria, numbness, or tingling. Pt additionally denies any recent falls, LOC, recent travel, recent illness, or sick contacts.     In the ED, pt was noted to be hypotensive with UA positive for evidence of UTI. Pt received 1.5 L bolus of NS and 1g CTX with improvement in hypotension and fatigue.     ED vitals: T 97.3, HR 90, BP 94/48, RR 16, O2 95% on RA  ED labs: WBC 7.57, Hgb 8.8, Cl 110, HCO3 20, BUN 44, Cr 4.49, glucose 182, Alk Phos 150, UA cloudy, +100 protein, trace ketone, moderate blood, moderate leukocyte esterase, many WBCs, > 10 RBCs, bacteria present   ED studies: CXR clear, EKG sinus bradycardia (HR 58)  ED Interventions: CTX 1g IV x1, NS 1.5L bolus      SIMI MATIAS [id number] is a 71 yo Female with PMH of seizure disorder, ESRD on HD MWF (last dialysis on Monday), CAD s/p 5 stents (last stent in 2017), HTN, and DM on insulin who presents to the ED with 1 day of fatigue. Pt reports that the day prior to admission, she underwent minor AV fistula revision surgery (AVF initially placed in LUE August 2022, revised in December 2022) under local anesthesia. On day of admission, pt awoke in usual state of health, cooked breakfast, and began to feel fatigued and tired while she was getting ready to go to her follow-up appointment to get stitches removed. During her follow-up appointment, she was noted to have low blood pressure and was instructed to go to the ED. Pt additionally reported increased urinary frequency over the past few days, but otherwise denied any fever, chills, HA, dizziness, CP, SOB, palpitations, abdominal pain, n/v, diarrhea, constipation, dysuria, hematuria, numbness, or tingling. Pt additionally denies any recent falls, LOC, recent travel, recent illness, or sick contacts.     In the ED, pt was noted to be hypotensive with UA positive for evidence of UTI. Pt received 1.5 L bolus of NS and 1g CTX with improvement in hypotension and fatigue.     ED vitals: T 97.3, HR 90, BP 94/48, RR 16, O2 95% on RA  ED labs: WBC 7.57, Hgb 8.8, Cl 110, HCO3 20, BUN 44, Cr 4.49, glucose 182, Alk Phos 150, lactate 1.8, UA cloudy, +100 protein, trace ketone, moderate blood, moderate leukocyte esterase, many WBCs, > 10 RBCs, bacteria present   ED studies: CXR clear, EKG sinus bradycardia (HR 58)  ED Interventions: CTX 1g IV x1, NS 1.5L bolus

## 2023-03-08 NOTE — ED PROVIDER NOTE - NS ED ROS FT
Constitutional: No fever or chills, + fatigue  Eyes: No discharge or drainage  Ears, Nose, Mouth, Throat: No nasal discharge, no sore throat  Cardiovascular: No chest pain, no palpitations  Respiratory: No shortness of breath, no cough  Gastrointestinal: No nausea or vomiting, no abdominal pain, no diarrhea or constipation  Musculoskeletal: No joint pain, no swelling  Skin: No rashes or lesions  Neurological: No numbness, weakness, tingling, no headache  Psychiatric: No depression

## 2023-03-08 NOTE — H&P ADULT - PROBLEM SELECTOR PLAN 4
History of seizure disorder on Depakote 250 mg BID and Abilify 2 mg daily at home. Reports compliance with home meds.   - c/w home meds

## 2023-03-08 NOTE — H&P ADULT - PROBLEM SELECTOR PLAN 3
Pt started on dialysis in Dec 2022 via L tunneled subclavian HD catheter. AV fistula initially placed in Aug 2022 with revision surgery performed in Dec 2022. Underwent additional revision surgery on 3/7/23 (day prior to admission) under local anesthesia. Last dialyzed on Monday 3/6/23. BUN/Cr on admission 44/4.49. Lowest recorded Cr 3.20 in August 2022. No clinical signs of fluid overload in ED.   - Renal consult in AM   - Avoid nephrotoxic medications  - Caution with fluids   - Trend BUN/Cr   - Renal diet

## 2023-03-08 NOTE — ED PROVIDER NOTE - OBJECTIVE STATEMENT
72-year-old female with history of seizure disorder, end-stage renal disease on HD via left AV fistula Monday, Wednesday, Friday, last dialyzed on Monday presenting with reported hypotension while at dialysis center.  Patient did not receive dialysis today.  Was reportedly hypotensive and increasingly lethargic per home health aide.  Patient was reportedly normal yesterday.  Denies fever, denies cough or shortness of breath, no vomiting, no abdominal pain, no nausea or vomiting, no urinary complaints.  No headache or blurry vision, no numbness or weakness or tingling.  Of note, patient on Klonopin, states that she took Klonopin this morning as prescribed, denies taking excess Klonopin, no SI or HI. ROS as above.

## 2023-03-08 NOTE — ED ADULT TRIAGE NOTE - CHIEF COMPLAINT QUOTE
a&ox4 BIBA from PCP office today c.o hypotension. as per EMS " she had low bp in the office so they called 911. she doesn't remember how much Klonopin she took this morning." no LOC. denies dizziness, numbness, tingling, headaches, CP, SOB.

## 2023-03-08 NOTE — ED ADULT NURSE NOTE - NSICDXPASTSURGICALHX_GEN_ALL_CORE_FT
PAST SURGICAL HISTORY:  AV fistula     Cataract     Elective surgery coronary stent x5    H/O cervical spine surgery     H/O  section x3    History of loop recorder removed    History of thyroid surgery

## 2023-03-08 NOTE — H&P ADULT - NSHPPHYSICALEXAM_GEN_ALL_CORE
T(C): 36.6 (03-08-23 @ 21:43), Max: 36.6 (03-08-23 @ 14:30)  HR: 62 (03-08-23 @ 21:43) (59 - 90)  BP: 122/63 (03-08-23 @ 21:43) (94/48 - 122/63)  RR: 16 (03-08-23 @ 21:43) (16 - 16)  SpO2: 99% (03-08-23 @ 21:43) (95% - 99%)    GENERAL: Elderly female, lying in bed, in NAD   HEENT: NC/AT, PERRLA, EOMI, conjunctiva and sclera clera, MMM, OP clear, good dentition   RESP: CTAB, no rales, ronchi, or wheezing   CV: RRR, normal S1S2, no murmurs, rubs, or gallops   ABD: Abdomen soft NTND, +BS, no rebound or guarding, no palpable masses   EXT: +LUE AVF with remaining stitch in place. +palpable thrill. Ext WWP, 2+ peripheral pulses, no clubbing or cyanosis. 2+ pitting edema present in bilateral lower extremities   NEURO: A&Ox3, no focal neurologic deficits   PSYCH: Normal mood and affect   LINES: +Tunneled dialysis catheter present in R subclavicular region. Insertion site clean and dry with no purulence or oozing.

## 2023-03-08 NOTE — H&P ADULT - PROBLEM SELECTOR PLAN 1
Presented to ED for hypotension noted on outpatient follow-up appointment, with BP 94/48 in ED. Hypotension possibly i/s/o UTI. Improved with 1.5 L NS bolus.   - Monitor BP   - Caution with fluids   - Hold HTN meds

## 2023-03-08 NOTE — H&P ADULT - ATTENDING COMMENTS
Pt is a 72 F w/ PMH seizure disorder, ESRD on HD MWF (last HD 3/6), CAD s/p 5 stents (last stent in 2017), HTN, and DM on insulin who presents for hypotension noted in outpatient setting, also with 1 day of fatigue and several days of urinary frequency. Pt found to have positive U/A concerning infection. Pt admitted for further management of hypotension and UTI. BP improved after IVF. Will hold home BP meds for now and continue to monitor BPs. C/w IV ceftriaxone for UTI and f/u urine cx. Renal consult in AM for HD, pt missed Wednesday HD session.

## 2023-03-08 NOTE — H&P ADULT - ASSESSMENT
72 F w/ PMH seizure disorder, ESRD on HD MWF (last dialysis on Monday), CAD s/p 5 stents (last stent in 2017), HTN, and DM on insulin who presents for 1 day of fatigue, found to be hypotensive, admitted for treatment of UTI. 72 F w/ PMH seizure disorder, ESRD on HD MWF (last dialysis on Monday), CAD s/p 5 stents (last stent in 2017), HTN, and DM on insulin who presents for hypotension noted in outpatient setting, also with 1 day of fatigue and several days of urinary frequency, admitted for treatment of UTI

## 2023-03-08 NOTE — H&P ADULT - PROBLEM SELECTOR PLAN 2
Presented with 1 day of fatigue and several days of urinary frequency. Denied dysuria or hematuria. UA on admission cloudy, +100 protein, trace ketone, moderate blood, moderate leukocyte esterase, many WBCs, > 10 RBCs, bacteria present. s/p 1g CTX IV in ED.   - c/w CTX 1g daily   - q6h BS, straight cath for > 300 cc   - Monitor CBC Presented with 1 day of fatigue and several days of urinary frequency. Denied dysuria or hematuria. UA on admission cloudy, +100 protein, trace ketone, moderate blood, moderate leukocyte esterase, many WBCs, > 10 RBCs, bacteria present. s/p 1g CTX IV in ED.   - c/w CTX 1g daily   - f/u BS, straight cath for > 300 cc   - Monitor CBC Presented with 1 day of fatigue and several days of urinary frequency. Denied dysuria or hematuria. UA on admission cloudy, +100 protein, trace ketone, moderate blood, moderate leukocyte esterase, many WBCs, > 10 RBCs, bacteria present. s/p 1g CTX IV in ED.   - c/w CTX 1g daily   - f/u BS, straight cath for > 300 cc   - Monitor CBC  - f/u UCx

## 2023-03-08 NOTE — ED PROVIDER NOTE - DATE/TIME 1
Abdominal pain and pressure for 1 week. Denies vomiting and diarrhea. States feels like something is blocked, LBM small today. Loss of appetite. Fatigued, sleeping all day. T99.9  RA Sa02 on arrival 87%.   No covid vaccine
08-Mar-2023 17:52

## 2023-03-08 NOTE — H&P ADULT - NSHPLABSRESULTS_GEN_ALL_CORE
LABS:                        8.8    7.57  )-----------( 187      ( 08 Mar 2023 16:08 )             29.6         142  |  110<H>  |  44<H>  ----------------------------<  182<H>  3.5   |  20<L>  |  4.49<H>    Ca    7.4<L>      08 Mar 2023 16:08    TPro  4.6<L>  /  Alb  2.7<L>  /  TBili  <0.2  /  DBili  x   /  AST  15  /  ALT  9<L>  /  AlkPhos  150<H>          CAPILLARY BLOOD GLUCOSE      POCT Blood Glucose.: 222 mg/dL (08 Mar 2023 14:33)              Urinalysis Basic - ( 08 Mar 2023 16:11 )    Color: Yellow / Appearance: Cloudy / S.025 / pH: x  Gluc: x / Ketone: Trace mg/dL  / Bili: Negative / Urobili: 0.2 E.U./dL   Blood: x / Protein: 100 mg/dL / Nitrite: NEGATIVE   Leuk Esterase: Moderate / RBC: > 10 /HPF / WBC Many /HPF   Sq Epi: x / Non Sq Epi: 0-5 /HPF / Bacteria: Present /HPF                I & O Summary:      Microbiology:    Urinalysis with Rflx Culture (collected 23 @ 16:11)        Urinalysis with Rflx Culture (collected 08 Mar 2023 16:11)        RADIOLOGY, EKG AND ADDITIONAL TESTS: Reviewed.

## 2023-03-08 NOTE — H&P ADULT - PROBLEM SELECTOR PLAN 5
Pt has history of diabetes; self-injects insulin at home. Reports taking 10-12 units Lantus in evenings and sliding scale with meals. FSGs range from 120s-160s.  on admission.  - c/w Lantus 10 units nightly  - mISS   - q6 FSG Pt has history of diabetes; self-injects insulin at home. Reports taking 10-12 units Lantus in evenings and sliding scale with meals. FSGs range from 120s-160s.  on admission.  - c/w Lantus 10 units nightly  - mISS   - q6 FSG  - f/u A1c

## 2023-03-08 NOTE — H&P ADULT - PROBLEM SELECTOR PLAN 6
History of CAD s/p 5 stents. Last stent placed in 2017. Pt does not remember when previous stents were placed, but reports that they were over 10 years ago. On admission, denied CP or SOB, no changes noted on EKG.   - c/w home ASA 81 mg

## 2023-03-08 NOTE — ED ADULT NURSE NOTE - NSICDXPASTMEDICALHX_GEN_ALL_CORE_FT
PAST MEDICAL HISTORY:  Anemia     Angina pectoris     AV fistula     CAD (coronary artery disease)     DM (diabetes mellitus) type 2    ESRD on hemodialysis right chest port    Gout     H/O carpal tunnel syndrome     H/O hyperthyroidism     HTN (hypertension)     OA (osteoarthritis)     Obesity     Seizures 10 y ago, las t episode

## 2023-03-08 NOTE — H&P ADULT - PROBLEM SELECTOR PLAN 7
Home meds: Home meds: Metoprolol tartrate 100 mg BID, nifedipine 60 mg daily   - Hold home meds i/s/o hypotension   - Formal med rec in AM Home meds: Metoprolol tartrate 100 mg BID, nifedipine 60 mg daily, clonidine 0.2 mg daily   - Hold home meds i/s/o hypotension   - Formal med rec in AM

## 2023-03-08 NOTE — PATIENT PROFILE ADULT - FUNCTIONAL ASSESSMENT - BASIC MOBILITY 6.
3-calculated by average /Not able to assess (calculate score using [facility] averaging method)  3-calculated by average/Not able to assess (calculate score using [facility] averaging method)

## 2023-03-08 NOTE — ED ADULT TRIAGE NOTE - OTHER COMPLAINTS
M, W, Friday dialysis PT, last had dialysis on Monday. was at PCP office to have sutures removed from L AV fistula.

## 2023-03-08 NOTE — PATIENT PROFILE ADULT - FALL HARM RISK - HARM RISK INTERVENTIONS

## 2023-03-08 NOTE — ED PROVIDER NOTE - PROGRESS NOTE DETAILS
Pt doing much better, perking up, BP improving, UA +, given abx. Not congested on CXR, ordered for additional fluids. Will admit.

## 2023-03-08 NOTE — ED PROVIDER NOTE - EKG #1 DATE/TIME
08-Mar-2023 14:34 Clofazimine Pregnancy And Lactation Text: This medication is Pregnancy Category C and isn't considered safe during pregnancy. It is excreted in breast milk.

## 2023-03-08 NOTE — ED ADULT TRIAGE NOTE - HEIGHT IN INCHES
3 Suturegard Body: The suture ends were repeatedly re-tightened and re-clamped to achieve the desired tissue expansion.

## 2023-03-08 NOTE — ED ADULT NURSE NOTE - OBJECTIVE STATEMENT
72y F, ESRD (MWF schedule, last dialyzed on Monday), rt chest port, left AVF, presents to the ED c/o hypotension and increased lethargy at PCP office today. Pt did not complete dialysis today. Pt appears lethargic/sleepy on assessment. Pt states, "I take Klonopin before dialysis, I only took 1 tablet today as prescribed." Denies chest pain, SOB, fever, chills, NVD, SI or HI. Pt A&Ox4, ambulatory with steady gait, speaking in clear/full sentences, no acute distress, vital signs stable.

## 2023-03-08 NOTE — ED PROVIDER NOTE - CLINICAL SUMMARY MEDICAL DECISION MAKING FREE TEXT BOX
72-year-old female with end-stage renal disease presenting with hypertension, lethargy.  Patient with soft blood pressure, lethargic but following commands, arousable.   Not hypoglycemic. Will check rectal temp to ro infection, infectious workup including labs, UA, CXR. Will check electrolytes as patient missed dialysis. Symptoms may also be 2/2 klonipin, will send utox, reassess.

## 2023-03-09 VITALS
SYSTOLIC BLOOD PRESSURE: 180 MMHG | RESPIRATION RATE: 18 BRPM | TEMPERATURE: 98 F | DIASTOLIC BLOOD PRESSURE: 65 MMHG | HEART RATE: 63 BPM | OXYGEN SATURATION: 100 %

## 2023-03-09 LAB
A1C WITH ESTIMATED AVERAGE GLUCOSE RESULT: 6.1 % — HIGH (ref 4–5.6)
ALBUMIN SERPL ELPH-MCNC: 3.1 G/DL — LOW (ref 3.3–5)
ALP SERPL-CCNC: 187 U/L — HIGH (ref 40–120)
ALT FLD-CCNC: 10 U/L — SIGNIFICANT CHANGE UP (ref 10–45)
ANION GAP SERPL CALC-SCNC: 12 MMOL/L — SIGNIFICANT CHANGE UP (ref 5–17)
AST SERPL-CCNC: 11 U/L — SIGNIFICANT CHANGE UP (ref 10–40)
BILIRUB SERPL-MCNC: <0.2 MG/DL — SIGNIFICANT CHANGE UP (ref 0.2–1.2)
BUN SERPL-MCNC: 59 MG/DL — HIGH (ref 7–23)
CALCIUM SERPL-MCNC: 9 MG/DL — SIGNIFICANT CHANGE UP (ref 8.4–10.5)
CHLORIDE SERPL-SCNC: 104 MMOL/L — SIGNIFICANT CHANGE UP (ref 96–108)
CO2 SERPL-SCNC: 24 MMOL/L — SIGNIFICANT CHANGE UP (ref 22–31)
CREAT SERPL-MCNC: 5.8 MG/DL — HIGH (ref 0.5–1.3)
EGFR: 7 ML/MIN/1.73M2 — LOW
ESTIMATED AVERAGE GLUCOSE: 128 MG/DL — HIGH (ref 68–114)
GLUCOSE BLDC GLUCOMTR-MCNC: 128 MG/DL — HIGH (ref 70–99)
GLUCOSE BLDC GLUCOMTR-MCNC: 172 MG/DL — HIGH (ref 70–99)
GLUCOSE BLDC GLUCOMTR-MCNC: 258 MG/DL — HIGH (ref 70–99)
GLUCOSE SERPL-MCNC: 210 MG/DL — HIGH (ref 70–99)
HCT VFR BLD CALC: 30.8 % — LOW (ref 34.5–45)
HCV AB S/CO SERPL IA: 0.05 S/CO — SIGNIFICANT CHANGE UP
HCV AB SERPL-IMP: SIGNIFICANT CHANGE UP
HGB BLD-MCNC: 8.9 G/DL — LOW (ref 11.5–15.5)
MAGNESIUM SERPL-MCNC: 2.2 MG/DL — SIGNIFICANT CHANGE UP (ref 1.6–2.6)
MCHC RBC-ENTMCNC: 28.6 PG — SIGNIFICANT CHANGE UP (ref 27–34)
MCHC RBC-ENTMCNC: 28.9 GM/DL — LOW (ref 32–36)
MCV RBC AUTO: 99 FL — SIGNIFICANT CHANGE UP (ref 80–100)
NRBC # BLD: 0 /100 WBCS — SIGNIFICANT CHANGE UP (ref 0–0)
PHOSPHATE SERPL-MCNC: 4.6 MG/DL — HIGH (ref 2.5–4.5)
PLATELET # BLD AUTO: 184 K/UL — SIGNIFICANT CHANGE UP (ref 150–400)
POTASSIUM SERPL-MCNC: 3.9 MMOL/L — SIGNIFICANT CHANGE UP (ref 3.5–5.3)
POTASSIUM SERPL-SCNC: 3.9 MMOL/L — SIGNIFICANT CHANGE UP (ref 3.5–5.3)
PROT SERPL-MCNC: 5.5 G/DL — LOW (ref 6–8.3)
RBC # BLD: 3.11 M/UL — LOW (ref 3.8–5.2)
RBC # FLD: 17.5 % — HIGH (ref 10.3–14.5)
SODIUM SERPL-SCNC: 140 MMOL/L — SIGNIFICANT CHANGE UP (ref 135–145)
WBC # BLD: 6.12 K/UL — SIGNIFICANT CHANGE UP (ref 3.8–10.5)
WBC # FLD AUTO: 6.12 K/UL — SIGNIFICANT CHANGE UP (ref 3.8–10.5)

## 2023-03-09 PROCEDURE — 81001 URINALYSIS AUTO W/SCOPE: CPT

## 2023-03-09 PROCEDURE — 84100 ASSAY OF PHOSPHORUS: CPT

## 2023-03-09 PROCEDURE — 83735 ASSAY OF MAGNESIUM: CPT

## 2023-03-09 PROCEDURE — 87635 SARS-COV-2 COVID-19 AMP PRB: CPT

## 2023-03-09 PROCEDURE — 83036 HEMOGLOBIN GLYCOSYLATED A1C: CPT

## 2023-03-09 PROCEDURE — 99285 EMERGENCY DEPT VISIT HI MDM: CPT

## 2023-03-09 PROCEDURE — 80053 COMPREHEN METABOLIC PANEL: CPT

## 2023-03-09 PROCEDURE — 99239 HOSP IP/OBS DSCHRG MGMT >30: CPT | Mod: GC

## 2023-03-09 PROCEDURE — 87086 URINE CULTURE/COLONY COUNT: CPT

## 2023-03-09 PROCEDURE — 83605 ASSAY OF LACTIC ACID: CPT

## 2023-03-09 PROCEDURE — 36415 COLL VENOUS BLD VENIPUNCTURE: CPT

## 2023-03-09 PROCEDURE — 93005 ELECTROCARDIOGRAM TRACING: CPT

## 2023-03-09 PROCEDURE — 99222 1ST HOSP IP/OBS MODERATE 55: CPT

## 2023-03-09 PROCEDURE — 96374 THER/PROPH/DIAG INJ IV PUSH: CPT

## 2023-03-09 PROCEDURE — 85025 COMPLETE CBC W/AUTO DIFF WBC: CPT

## 2023-03-09 PROCEDURE — 71045 X-RAY EXAM CHEST 1 VIEW: CPT

## 2023-03-09 PROCEDURE — 80307 DRUG TEST PRSMV CHEM ANLYZR: CPT

## 2023-03-09 PROCEDURE — 86803 HEPATITIS C AB TEST: CPT

## 2023-03-09 PROCEDURE — 82962 GLUCOSE BLOOD TEST: CPT

## 2023-03-09 PROCEDURE — 85027 COMPLETE CBC AUTOMATED: CPT

## 2023-03-09 RX ORDER — NIFEDIPINE 30 MG
60 TABLET, EXTENDED RELEASE 24 HR ORAL ONCE
Refills: 0 | Status: COMPLETED | OUTPATIENT
Start: 2023-03-09 | End: 2023-03-09

## 2023-03-09 RX ORDER — METOPROLOL TARTRATE 50 MG
1 TABLET ORAL
Qty: 0 | Refills: 0 | DISCHARGE

## 2023-03-09 RX ADMIN — INSULIN GLARGINE 10 UNIT(S): 100 INJECTION, SOLUTION SUBCUTANEOUS at 00:55

## 2023-03-09 RX ADMIN — DIVALPROEX SODIUM 250 MILLIGRAM(S): 500 TABLET, DELAYED RELEASE ORAL at 06:23

## 2023-03-09 RX ADMIN — Medication 60 MILLIGRAM(S): at 15:47

## 2023-03-09 RX ADMIN — Medication 81 MILLIGRAM(S): at 15:47

## 2023-03-09 RX ADMIN — Medication 6: at 13:15

## 2023-03-09 RX ADMIN — PANTOPRAZOLE SODIUM 40 MILLIGRAM(S): 20 TABLET, DELAYED RELEASE ORAL at 06:23

## 2023-03-09 RX ADMIN — ARIPIPRAZOLE 2 MILLIGRAM(S): 15 TABLET ORAL at 15:47

## 2023-03-09 RX ADMIN — HEPARIN SODIUM 5000 UNIT(S): 5000 INJECTION INTRAVENOUS; SUBCUTANEOUS at 06:22

## 2023-03-09 NOTE — DISCHARGE NOTE PROVIDER - NSDCCPCAREPLAN_GEN_ALL_CORE_FT
PRINCIPAL DISCHARGE DIAGNOSIS  Diagnosis: Hypotension  Assessment and Plan of Treatment: You were admitted for treatment of low blood pressure, which improved with IV fluids. On discharge, please STOP taking metoprolol tartrate 100 mg and clonidine 0.2 mg until your follow-up appointment with your cardiologist. Please CONTINUE taking nifedipine 60 mg daily. Please AVOID taking your nifedipine on dialysis days-- you can take it after your dialysis session if your blood pressure remains high.      SECONDARY DISCHARGE DIAGNOSES  Diagnosis: UTI (urinary tract infection)  Assessment and Plan of Treatment: You were found to have a UTI on admission to the hospital and were treated with 2 days of IV antibiotics. On discharge, please take Bactrim once daily for 3 days.

## 2023-03-09 NOTE — CONSULT NOTE ADULT - ASSESSMENT
Assessment/Plan:   73 yo F with ESRD on HD MWF, HTN, DM, Seizure disorder admitted for UTI. Nephrology consulted for HD    #ESRD on HD MWF [facility] ([address] 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 Nephrologist Dr. Burnham  usual Rx [id number], EDW 75 kg   last hemodialysis 3/6  Pt currently euvolemic on exam  Electrolytes stable  No urgent indication of HD at the moment. Pt can be discharged today but please ensure she goes to her outpatient HD center tomorrow for HD. Pt confirmed that she is usually at 1030 AM shift at her outpatient dialysis    #HTN   BP at goal now. Initially was hypotensive    #access   Rt TDC  LUE AVF functional. AV fistula revision surgery on 3/7 under local anesthesia (AVF initially placed in LUE August 2022, revised in December 2022). Vascular sx Dr. Marquez    #anemia  Hb not at goal   EPO w/ HD  transfusion as per primary team     #renal bone disease   Ca 9  Phos 4.6  PTH pending   no indication for Hectorol at this time     Thank you for the opportunity to participate in the care of your patient. The nephrology service remains available to assist with any questions or concerns. Please feel free to reach us by paging the on-call nephrology fellow for urgent issues or as below.     Julian Carrington  PGY-4, Nephrology Fellow    P: [phone]

## 2023-03-09 NOTE — DISCHARGE NOTE PROVIDER - HOSPITAL COURSE
#Discharge: do not delete    72 F w/ PMH seizure disorder, ESRD on HD MWF (last dialysis on Monday), CAD s/p 5 stents (last stent in 2017), HTN, and DM on insulin who presents for hypotension noted in outpatient setting, also with 1 day of fatigue and several days of urinary frequency, admitted for treatment of UTI     Hospital course (by problem):     #Hypotension: Presented to ED for hypotension noted on outpatient follow-up appointment, with BP 94/48 in ED. Hypotension possibly i/s/o UTI vs early initiation of BP meds post op. Improved with 1.5 L NS bolus. BP now stable.       #UTI (urinary tract infection): Presented with 1 day of fatigue and several days of urinary frequency. Denied dysuria or hematuria. UA on admission cloudy, +100 protein, trace ketone, moderate blood, moderate leukocyte esterase, many WBCs, > 10 RBCs, bacteria present. s/p 1g CTX IV for 2 days. C/w bactrim for 3 days.     #ESRD on dialysis: Pt started on dialysis in Dec 2022 via L tunneled subclavian HD catheter. AV fistula initially placed in Aug 2022 with revision surgery performed in Dec 2022. Underwent additional revision surgery on 3/7/23 (day prior to admission) under local anesthesia. Last dialyzed on Monday 3/6/23. BUN/Cr on admission 44/4.49. Lowest recorded Cr 3.20 in August 2022. No clinical signs of fluid overload in ED. C/w current HD sessions.     #Seizure disorder: History of seizure disorder on Depakote 250 mg BID and Abilify 2 mg daily at home. Reports compliance with home meds. c/w home meds.    #DM (diabetes mellitus): Pt has history of diabetes; self-injects insulin at home. Reports taking 10-12 units Lantus in evenings and sliding scale with meals. FSGs range from 120s-160s.  on admission. c/w home regimen.     #CAD (coronary artery disease): History of CAD s/p 5 stents. Last stent placed in 2017. Pt does not remember when previous stents were placed, but reports that they were over 10 years ago. On admission, denied CP or SOB, no changes noted on EKG. c/w home ASA 81 mg.    #HTN (hypertension): *****    Metoprolol tartrate 100 mg BID, nifedipine 60 mg daily, clonidine 0.2 mg daily   - Hold home meds i/s/o hypotension   - Formal med rec in AM.      Patient was discharged to: (home/BILLY/acute rehab/hospice, etc, and with what services – home health PT/RN? Home O2?)    New medications:   Changes to old medications:  Medications that were stopped:    Items to follow up as outpatient:    Physical exam at the time of discharge:       #Discharge: do not delete    72 F w/ PMH seizure disorder, ESRD on HD MWF (last dialysis on Monday), CAD s/p 5 stents (last stent in 2017), HTN, and DM on insulin who presents for hypotension noted in outpatient setting, also with 1 day of fatigue and several days of urinary frequency, admitted for treatment of UTI     Hospital course (by problem):     #Hypotension: Presented to ED for hypotension noted on outpatient follow-up appointment, with BP 94/48 in ED. Hypotension possibly i/s/o UTI vs early initiation of BP meds post op. Improved with 1.5 L NS bolus. BP now stable.     #UTI (urinary tract infection): Presented with 1 day of fatigue and several days of urinary frequency. Denied dysuria or hematuria. UA on admission cloudy, +100 protein, trace ketone, moderate blood, moderate leukocyte esterase, many WBCs, > 10 RBCs, bacteria present. s/p 1g CTX IV for 2 days. C/w bactrim for 3 days.     #ESRD on dialysis: Pt started on dialysis in Dec 2022 via L tunneled subclavian HD catheter. AV fistula initially placed in Aug 2022 with revision surgery performed in Dec 2022. Underwent additional revision surgery on 3/7/23 (day prior to admission) under local anesthesia. Last dialyzed on Monday 3/6/23. BUN/Cr on admission 44/4.49. Lowest recorded Cr 3.20 in August 2022. No clinical signs of fluid overload in ED. C/w current HD sessions.     #Seizure disorder: History of seizure disorder on Depakote 250 mg BID and Abilify 2 mg daily at home. Reports compliance with home meds. c/w home meds.    #DM (diabetes mellitus): Pt has history of diabetes; self-injects insulin at home. Reports taking 10-12 units Lantus in evenings and sliding scale with meals. FSGs range from 120s-160s.  on admission. c/w home regimen.     #CAD (coronary artery disease): History of CAD s/p 5 stents. Last stent placed in 2017. Pt does not remember when previous stents were placed, but reports that they were over 10 years ago. On admission, denied CP or SOB, no changes noted on EKG. c/w home ASA 81 mg.    #HTN (hypertension): *****    Metoprolol tartrate 100 mg BID, nifedipine 60 mg daily, clonidine 0.2 mg daily   - Hold home meds i/s/o hypotension   - Formal med rec in AM.      Patient was discharged to: (home/BILLY/acute rehab/hospice, etc, and with what services – home health PT/RN? Home O2?)    New medications:   Changes to old medications:  Medications that were stopped:    Items to follow up as outpatient: f/u Dr. Arguello     Physical exam at the time of discharge:       #Discharge: do not delete    72 F w/ PMH seizure disorder, ESRD on HD MWF (last dialysis on Monday), CAD s/p 5 stents (last stent in 2017), HTN, and DM on insulin who presents for hypotension noted in outpatient setting, also with 1 day of fatigue and several days of urinary frequency, admitted for treatment of UTI     Hospital course (by problem):     #Hypotension: Presented to ED for hypotension noted on outpatient follow-up appointment, with BP 94/48 in ED. Hypotension possibly i/s/o UTI vs early initiation of BP meds post op. Improved with 1.5 L NS bolus. BP now stable. Home HTN meds held on discharge aside from nifedipine 60 mg daily     #UTI (urinary tract infection): Presented with 1 day of fatigue and several days of urinary frequency. Denied dysuria or hematuria. UA on admission cloudy, +100 protein, trace ketone, moderate blood, moderate leukocyte esterase, many WBCs, > 10 RBCs, bacteria present. s/p 1g CTX IV for 2 days. C/w bactrim for 3 days.     #ESRD on dialysis: Pt started on dialysis in Dec 2022 via L tunneled subclavian HD catheter. AV fistula initially placed in Aug 2022 with revision surgery performed in Dec 2022. Underwent additional revision surgery on 3/7/23 (day prior to admission) under local anesthesia. Last dialyzed on Monday 3/6/23. BUN/Cr on admission 44/4.49. Lowest recorded Cr 3.20 in August 2022. No clinical signs of fluid overload in ED. C/w current HD sessions.     #Seizure disorder: History of seizure disorder on Depakote 250 mg BID and Abilify 2 mg daily at home. Reports compliance with home meds. c/w home meds.    #DM (diabetes mellitus): Pt has history of diabetes; self-injects insulin at home. Reports taking 10-12 units Lantus in evenings and sliding scale with meals. FSGs range from 120s-160s.  on admission. c/w home regimen.     #CAD (coronary artery disease): History of CAD s/p 5 stents. Last stent placed in 2017. Pt does not remember when previous stents were placed, but reports that they were over 10 years ago. On admission, denied CP or SOB, no changes noted on EKG. c/w home ASA 81 mg.    #HTN (hypertension): Home meds: Metoprolol tartrate 100 mg BID, nifedipine 60 mg daily, clonidine 0.2 mg daily. Pt continued on nifedipine 60 mg PO daily (hold on dialysis days), remainder of home BP meds held.       Patient was discharged to: Home w/ HHA     New medications: Bactrim 1 DS tablet once daily x3 days   Changes to old medications: None  Medications that were stopped: Metoprolol tartrate 100 mg BID, clonidine 0.2 mg daily     Items to follow up as outpatient: f/u Dr. Arguello     Physical exam at the time of discharge:  GENERAL: Elderly female, lying in bed, in NAD   HEENT: NC/AT, PERRLA, EOMI, conjunctiva and sclera clera, MMM, OP clear, good dentition   RESP: CTAB, no rales, ronchi, or wheezing   CV: RRR, normal S1S2, no murmurs, rubs, or gallops   ABD: Abdomen soft NTND, +BS, no rebound or guarding, no palpable masses   EXT: +LUE AVF with remaining stitch in place. +palpable thrill. Ext WWP, 2+ peripheral pulses, no clubbing or cyanosis. 2+ pitting edema present in bilateral lower extremities   NEURO: A&Ox3, no focal neurologic deficits   PSYCH: Normal mood and affect   LINES: +Tunneled dialysis catheter present in R subclavicular region. Insertion site clean and dry with no purulence or oozing.

## 2023-03-09 NOTE — DISCHARGE NOTE PROVIDER - NSDCMRMEDTOKEN_GEN_ALL_CORE_FT
allopurinol 100 mg oral tablet: 1 tab(s) orally once a day  ARIPiprazole 2 mg oral tablet: 1 tab(s) orally once a day  aspirin 81 mg oral tablet: 1 tab(s) orally once a day  cloNIDine 0.2 mg oral tablet:   divalproex sodium 250 mg oral tablet, extended release: 1 tab(s) orally 2 times a day  Metoprolol Tartrate 100 mg oral tablet: 1 tab(s) orally 2 times a day  NIFEdipine 60 mg oral tablet, extended release: 1 tab(s) orally once a day  nitroglycerin 0.4 mg sublingual tablet: 1 tab(s) sublingual every 5 minutes, As Needed  NovoLOG FlexPen 100 units/mL injectable solution:   omeprazole 20 mg oral delayed release tablet: 1 tab(s) orally once a day  Senna 8.6 mg oral tablet: 2 tab(s) orally once a day, As Needed  traZODone 100 mg oral tablet: 100 milligram(s) orally once a day (at bedtime)   allopurinol 100 mg oral tablet: 1 tab(s) orally once a day  ARIPiprazole 2 mg oral tablet: 1 tab(s) orally once a day  aspirin 81 mg oral tablet: 1 tab(s) orally once a day  Bactrim  mg-160 mg oral tablet: 1 tab(s) orally once a day   divalproex sodium 250 mg oral tablet, extended release: 1 tab(s) orally 2 times a day  NIFEdipine 60 mg oral tablet, extended release: 1 tab(s) orally once a day  nitroglycerin 0.4 mg sublingual tablet: 1 tab(s) sublingual every 5 minutes, As Needed  NovoLOG FlexPen 100 units/mL injectable solution:   omeprazole 20 mg oral delayed release tablet: 1 tab(s) orally once a day  Senna 8.6 mg oral tablet: 2 tab(s) orally once a day, As Needed  traZODone 100 mg oral tablet: 100 milligram(s) orally once a day (at bedtime)

## 2023-03-09 NOTE — DISCHARGE NOTE PROVIDER - ATTENDING DISCHARGE PHYSICAL EXAMINATION:
I have read and agree with the resident Discharge Note above.  Patient seen and discussed with resident team on the day of discharge.     Briefly, 72 F w/ PMH seizure disorder, ESRD on HD MWF (last dialysis on Monday), CAD s/p 5 stents (last stent in 2017), HTN, and DM on insulin who presented for hypotension noted in outpatient setting, also with 1 day of fatigue and several days of urinary frequency, admitted for hypotension and found to have UTI.  Suspect hypotension is largely due to decreased PO intake and aggressive HTN regimen.  Will stop clonidine and Toprol 100mg BID, c/w Nifedipine but hold on HD days as patient reports often hypotensive at HD.  Patient to buy new BP cuff and resume other BP meds as needed in discussion with her cardiologist Dr Ribeiro.  F/u appt with cards next week     Attending exam on day of discharge:   Gen: NAD, sitting upright in bed   HEENT: NCAT, MMM, clear OP  Neck: supple  CV: RRR, no m/g/r appreciated  Pulm: CTA B, no w/r/r, normal work of breathing  Abd: +BS, soft, NTND  : deferred  Skin: no rashes, ulcers, jaundice; intact, warm and dry, R chest wall HD cath with site c/d/i  Ext: WWP, LUE fistula with 1 stitch in place  MSK: 5/5 strength, normal range of motion, no swollen or erythematous joints  Neuro: AOx3, no change from baseline  Psych: pleasant, conversant and appropriate    I was physically present for the evaluation and management services provided. I agree with the included history, physical, and plan which I reviewed and edited where appropriate. I spent > 30 minutes with the patient and the patient's family on direct patient care and discharge planning with more than 50% of the visit spent on counseling and/or coordination of care.     Billy Swann  [phone]

## 2023-03-09 NOTE — CHART NOTE - NSCHARTNOTEFT_GEN_A_CORE
Patient would medically benefit from increased home health aid hours. Patient would medically benefit from increased home health aide hours.

## 2023-03-09 NOTE — CONSULT NOTE ADULT - SUBJECTIVE AND OBJECTIVE BOX
HPI:  Pt is a 73 yo Female with ESRD on HD MWF (last dialysis on Monday), Seizure disorder, CAD s/p 5 stents (last stent in ), HTN, and DM on insulin who presents to the ED with 1 day of fatigue. Pt reports that the day prior to admission, she underwent minor AV fistula revision surgery (AVF initially placed in LUE 2022, revised in 2022) under local anesthesia. On day of admission, pt awoke in usual state of health, but later began to feel fatigued and tired while she was getting ready to go to her follow-up appointment to get stitches removed. During her follow-up appointment, she was noted to have low blood pressure and was instructed to go to the ED. Pt additionally reported increased urinary frequency over the past few days, but otherwise denied any fever, chills, HA, dizziness, CP, SOB, palpitations, abdominal pain, n/v, diarrhea, constipation, dysuria, hematuria, numbness, or tingling. Pt additionally denies any recent falls, LOC, recent travel, recent illness, or sick contacts.   In the ED, UA showed evidence of UTI. Pt was also hypotensive at 94/48, so got a 1.5 L NS bolus and 1g CTX. She was admitted for further observation and management. Nephrology consulted for HD      PAST MEDICAL & SURGICAL HISTORY:  HTN (hypertension)      DM (diabetes mellitus)  type 2      CAD (coronary artery disease)      Angina pectoris      ESRD on hemodialysis  right chest port      AV fistula      Seizures  10 y ago, las t episode      H/O carpal tunnel syndrome      H/O hyperthyroidism      Anemia      OA (osteoarthritis)      Obesity      Gout      H/O  section  x3      AV fistula      History of thyroid surgery      H/O cervical spine surgery      History of loop recorder  removed      Cataract      Elective surgery  coronary stent x5            Allergies:  IV Contrast (Hives)  morphine (Hives)  Percocet (Other)  shellfish (Swelling)      Home Medications:   allopurinol 100 mg oral tablet: 1 tab(s) orally once a day  ARIPiprazole 2 mg oral tablet: 1 tab(s) orally once a day  aspirin 81 mg oral tablet: 1 tab(s) orally once a day  cloNIDine 0.2 mg oral tablet:   divalproex sodium 250 mg oral tablet, extended release: 1 tab(s) orally 2 times a day  Metoprolol Tartrate 100 mg oral tablet: 1 tab(s) orally 2 times a day  NIFEdipine 60 mg oral tablet, extended release: 1 tab(s) orally once a day  nitroglycerin 0.4 mg sublingual tablet: 1 tab(s) sublingual every 5 minutes, As Needed  NovoLOG FlexPen 100 units/mL injectable solution:   omeprazole 20 mg oral delayed release tablet: 1 tab(s) orally once a day  Senna 8.6 mg oral tablet: 2 tab(s) orally once a day, As Needed  traZODone 100 mg oral tablet: 100 milligram(s) orally once a day (at bedtime)      Hospital Medications:   MEDICATIONS  (STANDING):  ARIPiprazole 2 milliGRAM(s) Oral daily  aspirin  chewable 81 milliGRAM(s) Oral daily  cefTRIAXone   IVPB 1000 milliGRAM(s) IV Intermittent every 24 hours  dextrose 5%. 1000 milliLiter(s) (100 mL/Hr) IV Continuous <Continuous>  dextrose 5%. 1000 milliLiter(s) (50 mL/Hr) IV Continuous <Continuous>  dextrose 50% Injectable 25 Gram(s) IV Push once  dextrose 50% Injectable 12.5 Gram(s) IV Push once  dextrose 50% Injectable 25 Gram(s) IV Push once  divalproex  milliGRAM(s) Oral every 12 hours  glucagon  Injectable 1 milliGRAM(s) IntraMuscular once  heparin   Injectable 5000 Unit(s) SubCutaneous every 8 hours  insulin glargine Injectable (LANTUS) 10 Unit(s) SubCutaneous at bedtime  insulin lispro (ADMELOG) corrective regimen sliding scale   SubCutaneous three times a day before meals  insulin lispro (ADMELOG) corrective regimen sliding scale   SubCutaneous at bedtime  pantoprazole    Tablet 40 milliGRAM(s) Oral before breakfast      SOCIAL HISTORY:  Denies ETOh, Smoking    Family History:  FAMILY HISTORY:        VITALS:  T(F): 97.5 (23 @ 05:30), Max: 97.9 (23 @ 14:30)  HR: 54 (23 @ 05:30)  BP: 147/58 (23 @ 05:30)  RR: 18 (23 @ 05:30)  SpO2: 100% (23 @ 05:30)  Wt(kg): --    Height (cm): 160 ( @ 22:00)  Weight (kg): 69.5 ( @ 22:00)  BMI (kg/m2): 27.1 ( @ 22:00)  BSA (m2): 1.73 ( @ 22:00)  CAPILLARY BLOOD GLUCOSE      POCT Blood Glucose.: 128 mg/dL (09 Mar 2023 09:10)  POCT Blood Glucose.: 172 mg/dL (09 Mar 2023 00:35)  POCT Blood Glucose.: 172 mg/dL (08 Mar 2023 22:54)  POCT Blood Glucose.: 222 mg/dL (08 Mar 2023 14:33)      Review of Systems:  Negative except as mentioned in HPI    PHYSICAL EXAM:  GENERAL: Alert, awake, oriented x3   CHEST/LUNG: Bilateral clear breath sounds  HEART: Regular rate and rhythm, no murmur  ABDOMEN: Soft, nontender, non distended  EXTREMITIES: trace LE edema   Neurology: AAOx3, no focal neurological deficit  ACCESS: Rt TDC. VIDA TERRY      LABS:      140  |  104  |  59<H>  ----------------------------<  210<H>  3.9   |  24  |  5.80<H>    Ca    9.0      09 Mar 2023 05:30  Phos  4.6       Mg     2.2         TPro  5.5<L>  /  Alb  3.1<L>  /  TBili  <0.2  /  DBili      /  AST  11  /  ALT  10  /  AlkPhos  187<H>      Creatinine Trend: 5.80 <--, 4.49 <--                        8.9    6.12  )-----------( 184      ( 09 Mar 2023 05:30 )             30.8     Urine Studies:  Urinalysis Basic - ( 08 Mar 2023 16:11 )    Color: Yellow / Appearance: Cloudy / S.025 / pH:   Gluc:  / Ketone: Trace mg/dL  / Bili: Negative / Urobili: 0.2 E.U./dL   Blood:  / Protein: 100 mg/dL / Nitrite: NEGATIVE   Leuk Esterase: Moderate / RBC: > 10 /HPF / WBC Many /HPF   Sq Epi:  / Non Sq Epi: 0-5 /HPF / Bacteria: Present /HPF

## 2023-03-09 NOTE — DISCHARGE NOTE NURSING/CASE MANAGEMENT/SOCIAL WORK - PATIENT PORTAL LINK FT
You can access the FollowMyHealth Patient Portal offered by [facility] by registering at the following website: [email or web address]. By joining [facility]’s FollowMyHealth portal, you will also be able to view your health information using other applications (apps) compatible with our system.

## 2023-03-10 LAB
CULTURE RESULTS: SIGNIFICANT CHANGE UP
SPECIMEN SOURCE: SIGNIFICANT CHANGE UP

## 2023-03-14 DIAGNOSIS — M19.90 UNSPECIFIED OSTEOARTHRITIS, UNSPECIFIED SITE: ICD-10-CM

## 2023-03-14 DIAGNOSIS — Z79.52 LONG TERM (CURRENT) USE OF SYSTEMIC STEROIDS: ICD-10-CM

## 2023-03-14 DIAGNOSIS — Z79.4 LONG TERM (CURRENT) USE OF INSULIN: ICD-10-CM

## 2023-03-14 DIAGNOSIS — Z91.041 RADIOGRAPHIC DYE ALLERGY STATUS: ICD-10-CM

## 2023-03-14 DIAGNOSIS — Z99.2 DEPENDENCE ON RENAL DIALYSIS: ICD-10-CM

## 2023-03-14 DIAGNOSIS — E11.22 TYPE 2 DIABETES MELLITUS WITH DIABETIC CHRONIC KIDNEY DISEASE: ICD-10-CM

## 2023-03-14 DIAGNOSIS — I95.9 HYPOTENSION, UNSPECIFIED: ICD-10-CM

## 2023-03-14 DIAGNOSIS — E05.90 THYROTOXICOSIS, UNSPECIFIED WITHOUT THYROTOXIC CRISIS OR STORM: ICD-10-CM

## 2023-03-14 DIAGNOSIS — Z95.5 PRESENCE OF CORONARY ANGIOPLASTY IMPLANT AND GRAFT: ICD-10-CM

## 2023-03-14 DIAGNOSIS — E11.21 TYPE 2 DIABETES MELLITUS WITH DIABETIC NEPHROPATHY: ICD-10-CM

## 2023-03-14 DIAGNOSIS — N39.0 URINARY TRACT INFECTION, SITE NOT SPECIFIED: ICD-10-CM

## 2023-03-14 DIAGNOSIS — Z79.82 LONG TERM (CURRENT) USE OF ASPIRIN: ICD-10-CM

## 2023-03-14 DIAGNOSIS — I12.0 HYPERTENSIVE CHRONIC KIDNEY DISEASE WITH STAGE 5 CHRONIC KIDNEY DISEASE OR END STAGE RENAL DISEASE: ICD-10-CM

## 2023-03-14 DIAGNOSIS — Z88.5 ALLERGY STATUS TO NARCOTIC AGENT: ICD-10-CM

## 2023-03-14 DIAGNOSIS — N18.6 END STAGE RENAL DISEASE: ICD-10-CM

## 2023-03-14 DIAGNOSIS — R35.0 FREQUENCY OF MICTURITION: ICD-10-CM

## 2023-03-14 DIAGNOSIS — N25.0 RENAL OSTEODYSTROPHY: ICD-10-CM

## 2023-03-14 DIAGNOSIS — Z91.013 ALLERGY TO SEAFOOD: ICD-10-CM

## 2023-03-14 DIAGNOSIS — I25.10 ATHEROSCLEROTIC HEART DISEASE OF NATIVE CORONARY ARTERY WITHOUT ANGINA PECTORIS: ICD-10-CM

## 2023-03-14 DIAGNOSIS — E66.9 OBESITY, UNSPECIFIED: ICD-10-CM

## 2023-03-14 DIAGNOSIS — G40.909 EPILEPSY, UNSPECIFIED, NOT INTRACTABLE, WITHOUT STATUS EPILEPTICUS: ICD-10-CM

## 2023-03-14 DIAGNOSIS — R00.1 BRADYCARDIA, UNSPECIFIED: ICD-10-CM

## 2023-03-14 DIAGNOSIS — M10.9 GOUT, UNSPECIFIED: ICD-10-CM

## 2023-03-14 DIAGNOSIS — D64.9 ANEMIA, UNSPECIFIED: ICD-10-CM

## 2023-05-26 NOTE — ED ADULT NURSE NOTE - NS ED NURSE RECORD ANOTHER HT AND WT
Spoke with patient and gave lab results from 4wk labs.  Pt states he has been taking his medication every day faithfully.  
Yes

## 2024-08-20 ENCOUNTER — APPOINTMENT (OUTPATIENT)
Dept: ORTHOPEDIC SURGERY | Facility: CLINIC | Age: 74
End: 2024-08-20

## 2024-08-20 VITALS — BODY MASS INDEX: 29.23 KG/M2 | HEIGHT: 63 IN | WEIGHT: 165 LBS | RESPIRATION RATE: 16 BRPM

## 2024-08-20 DIAGNOSIS — M79.644 PAIN IN RIGHT FINGER(S): ICD-10-CM

## 2024-08-20 DIAGNOSIS — G56.03 CARPAL TUNNEL SYNDROM,BILATERAL UPPER LIMBS: ICD-10-CM

## 2024-08-20 PROBLEM — Z00.00 ENCOUNTER FOR PREVENTIVE HEALTH EXAMINATION: Status: ACTIVE | Noted: 2024-08-20

## 2024-08-20 PROCEDURE — 73130 X-RAY EXAM OF HAND: CPT | Mod: 50

## 2024-08-20 PROCEDURE — 73070 X-RAY EXAM OF ELBOW: CPT | Mod: 50

## 2024-08-20 PROCEDURE — 20550 NJX 1 TENDON SHEATH/LIGAMENT: CPT | Mod: 59,LT

## 2024-08-20 PROCEDURE — 26740 TREAT FINGER FRACTURE EACH: CPT

## 2024-08-20 PROCEDURE — 99204 OFFICE O/P NEW MOD 45 MIN: CPT | Mod: 25

## 2024-08-20 RX ORDER — CLOPIDOGREL 75 MG/1
TABLET, FILM COATED ORAL
Refills: 0 | Status: ACTIVE | COMMUNITY

## 2024-08-20 RX ORDER — CINACALCET 90 MG/1
TABLET ORAL
Refills: 0 | Status: ACTIVE | COMMUNITY

## 2024-08-20 RX ORDER — ALLOPURINOL 200 MG/1
TABLET ORAL
Refills: 0 | Status: ACTIVE | COMMUNITY

## 2024-08-20 RX ORDER — ASPIRIN 325 MG/1
TABLET, FILM COATED ORAL
Refills: 0 | Status: ACTIVE | COMMUNITY

## 2024-08-20 NOTE — PHYSICAL EXAM
[de-identified] : Physical exam demonstrates the patient to be alert and oriented x 3 and capable of ambulation. The patient is well-developed and well-nourished in no apparent respiratory distress. The majority of the skin is intact bilaterally in the upper extremities without any bilateral elbow lymphadenopathy.  Evaluation of both elbows reveals full symmetric range of motion from full extension to 140 of flexion with full pronation and full supination. Negative Tinel's over the cubital tunnel bilaterally.  The wrists have symmetric range of motion bilaterally. There is no tenderness over the scaphoid, scapholunate, or lunotriquetral ligaments bilaterally. There is a negative Dawn's test bilaterally. There is no tenderness over the distal radial ulnar joint or TFCC and no evidence of instability bilaterally. There is no tenderness over the pisotriquetral joint, hamate hook, or CMC joints bilaterally. The patient is nontender over both scaphoids and anatomic snuffbox is bilaterally. There is no clubbing cyanosis or edema.  Diminished active digital range of motion bilaterally.  Tender over the left long and ring finger A1 pulley.  The right ring finger exhibits swelling around the DIP joint but minimal tenderness.  There is minimal range of motion at the affected joint.  Flexor and extensor tendons are intact.  There is good capillary refill of the digits bilaterally. There are no masses palpated or sensitivity over the median and ulnar nerves at the level of the wrist. There is a positive Tinel's over the bilateral carpal tunnel along with a positive Phalen's and carpal tunnel compression test.  There is 4/5 strength at the right APB and 3/5 strength at the left APB.  Mild thenar atrophy is present.  There is 5/5 strength at the FDI bilaterally.  Sensation is intact to light touch throughout the hand, however, there is subjective numbness at the more radial half of the fingertips. [de-identified] : PA, oblique and lateral x-rays of both wrist were obtained today to assess for bony injury.  There is presence of a chronic appearing right ring finger distal phalanx bony mallet fracture with subluxation of the DIP joint.  Atherosclerotic calcifications are noted at the right brachial artery and bilateral radial and ulnar arteries.  Surgical vessel clips are noted throughout the soft tissues of the left elbow.  Calcifications and enthesophytes noted at the right lateral epicondyle.  Bilateral thumb CMC arthritis as well as a chronic appearing ossicle at the right ulnar styloid.  Bilateral IP joint arthritis.

## 2024-08-20 NOTE — ASSESSMENT
[FreeTextEntry1] : My impression is that the patient has a left long and ring finger trigger finger. Initial treatment options were discussed shared decision-making was made to proceed with a corticosteroid injection into the flexor tendon sheath today. I explained that recurrence of symptoms is not uncommon. If this were to occur, consideration for another injection will be made. I did note that multiple, repeated injections become less efficacious over time and eventually, surgery should be considered.   My impression is that the patient has severe bilateral carpal tunnel syndrome. I discussed the nature of the condition as well as both nonoperative and operative treatment options. I explained that nonoperative management entails carpal tunnel night splint wear and/or corticosteroid injection into the carpal tunnel. I explained that both options do not definitively address the peripheral nerve compression and are only aimed to help alleviate symptoms.  Given the patient exhibits bilateral subjective sensory loss and irreversible muscle weakness I explained that nonsurgical management at this time is of limited benefit and can lead to worsening, progression of the sensory loss and weakness.  I recommended surgical intervention.  I discussed surgery, which entails decompression of the carpal tunnel by incising the transverse carpal ligament.  I explained that this minimizes/prevent the progression of continued nerve compression and worsening sensory loss/weakness.  I did note that it involves potential risks and complications, these included, but were not limited to, infection, wound healing issues, hematoma, nerve/tendon/vessel injury, neurapraxia, persistent or worsening numbness/tingling, new electrical-burning pain, weakness, atrophy, hand dysfunction, recurrence of symptoms, post-op pain, CRPS, wound sensitivity, pillar pain, exaggerated scar formation, stiffness, loss of motion, swelling and the potential for re-operation or secondary surgery in the future.  Shared decision making was made to proceed with left endoscopic versus open carpal tunnel release.  I discussed expected outcomes and explained that complete, 100% resolution of symptoms will most likely not be apparent, given the severity of her condition.  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  I discussed the chronic nature of this injury and that degenerative changes have most likely already begun to accrue.  I discussed both nonsurgical and surgical treatment options for this.  Given significant improvement in her symptoms, particularly pain, I explained that nonsurgical treatment is something that can be considered.  I explained that stiffness would most likely not improved, given the status of her joint.  She was in accordance with the plan.

## 2024-08-20 NOTE — HISTORY OF PRESENT ILLNESS
[Right] : right hand dominant [FreeTextEntry1] : 73yr old RHD female patient with past medical history of thyroid disorder and peripheral vascular as well as coronary heart disease presents to the office for evaluation of bilateral numbness and tingling.  No radicular symptoms.  She reports that the left is worse and the symptoms in the right hand are occasional. She was seen by a hand doctor who recommended her getting a brace which helped her temporarily.  She reports nocturnal symptoms and pins-and-needles.  She was also seen by another hand specialist who suggested surgery but had a kidney failure, so it was cancelled.   The patient also reports locking catching of the left long and ring finger.  She reports pain.  She also reports a chronic right ring finger injury months ago, the symptoms of which have resolved

## 2024-08-20 NOTE — PROCEDURE
[FreeTextEntry1] : My impression is that the patient has stenosing tenosynovitis of the left long and ring finger. We discussed treatment options and she elected to undergo a trigger finger injection. The risks, benefits, and alternatives were discussed with the patient. This included, but was not limited to nerve injury, infection, subcutaneous atrophy, skin depigmentation etc. Under informed consent and sterile conditions the flexor tendon sheath at the A1 pulley was injected with a combination of 1/2 cc Kenalog-10 and 1/2 cc of 2% plain lidocaine. It is my hopes that this significantly alleviates the patients symptoms.

## 2024-10-29 ENCOUNTER — APPOINTMENT (OUTPATIENT)
Dept: ORTHOPEDIC SURGERY | Facility: HOSPITAL | Age: 74
End: 2024-10-29

## 2024-11-14 ENCOUNTER — APPOINTMENT (OUTPATIENT)
Dept: ORTHOPEDIC SURGERY | Age: 74
End: 2024-11-14

## 2024-11-18 NOTE — H&P ADULT - PROBLEM SELECTOR PLAN 8
No F: s/p 1.5 L NS   E: HD  N: Renal diet   GI ppx: Protonix 40 mg PO daily   DVT ppx: Heparin 5000 units q8h SQ   Code status: Full code   Dispo: [facility]

## 2025-01-20 NOTE — PATIENT PROFILE ADULT - PATIENT'S GENDER IDENTITY
"Recommendations from today's MTM visit:                                                       Start Enbrel 50 mg subcutaneous injection every 7 days  Administration Video:  [email or web address]   Call [phone] to schedule visit with Jessica Dahl  Call [facility] at [phone] to fill your Enbrel    Follow-up: Return in 13 weeks (on 4/21/2025) for Follow up, with me, using a phone visit.    It was great speaking with you today.  I value your experience and would be very thankful for your time in providing feedback in our clinic survey. In the next few days, you may receive an email or text message from [employer] with a link to a survey related to your  clinical pharmacist.\"     To schedule another MTM appointment, please call the clinic directly or you may call the MTM scheduling line at [phone] or toll-free at [phone].     My Clinical Pharmacist's contact information:                                                      Please feel free to contact me with any questions or concerns you have.      Luis Antonio Dee, PharmD  Medication Therapy Management Pharmacist  [facility]  Phone: [phone]   " Nonbinary/GNC/Genderqueer Female

## 2025-05-29 ENCOUNTER — APPOINTMENT (OUTPATIENT)
Facility: CLINIC | Age: 75
End: 2025-05-29

## (undated) DEVICE — SLV COMPRESSION KNEE MED

## (undated) DEVICE — STAPLER SKIN PROXIMATE

## (undated) DEVICE — BLADE SURGICAL #15 CARBON

## (undated) DEVICE — CATH IV SAFE BC 20G X 1.16" (PINK)

## (undated) DEVICE — DRSG DERMABOND 0.7ML

## (undated) DEVICE — GEL AQUSNC PACKET 20GR

## (undated) DEVICE — SUT PROLENE 7-0 18" BV

## (undated) DEVICE — DRSG TELFA 3 X 8

## (undated) DEVICE — SUT VICRYL 4-0 27" SH UNDYED

## (undated) DEVICE — MARKING PEN W RULER

## (undated) DEVICE — PACK VASCULAR MINOR

## (undated) DEVICE — SYR LUER LOK 30CC

## (undated) DEVICE — GLV 7 PROTEXIS (WHITE)

## (undated) DEVICE — CATH IV SAFE BC 24G X 0.75" (YELLOW)

## (undated) DEVICE — SUT SILK 4-0 18" TIES

## (undated) DEVICE — DRAPE 1/2 SHEET 40X57"

## (undated) DEVICE — DRAPE HAND 77" X 146"

## (undated) DEVICE — GLV 7.5 PROTEXIS (WHITE)

## (undated) DEVICE — DRAPE 3/4 SHEET 52X76"

## (undated) DEVICE — NDL HYPO SAFE 22G X 1.5" (BLACK)

## (undated) DEVICE — SUT GORETEX CV-6 (5-0) 30" TTC-12

## (undated) DEVICE — TOURNIQUET ESMARK 4"

## (undated) DEVICE — SUT PROLENE 7-0 30" BV-1

## (undated) DEVICE — SUT GORETEX CV-6 (5-0) 30" TTC-9

## (undated) DEVICE — DRAPE LIGHT HANDLE COVER (GREEN)

## (undated) DEVICE — TOURNIQUET CUFF 18" DUAL PORT SINGLE BLADDER W PLC  (BLACK)

## (undated) DEVICE — SPECIMEN CONTAINER 4OZ

## (undated) DEVICE — LAP PAD 18 X 18"

## (undated) DEVICE — DRAIN PENROSE 5/8" X 18" LATEX

## (undated) DEVICE — SUT MONOCRYL 4-0 18" PS-2

## (undated) DEVICE — DRSG KERLIX ROLL 4.5"

## (undated) DEVICE — VENODYNE/SCD SLEEVE CALF MEDIUM

## (undated) DEVICE — WARMING BLANKET LOWER ADULT

## (undated) DEVICE — DRAPE PROBE COVER 5" X 96"

## (undated) DEVICE — DRAPE PROBE COVER LATEX FREE 3X96"

## (undated) DEVICE — SYR LUER LOK 10CC

## (undated) DEVICE — DRAIN PENROSE .5" X 18" LATEX

## (undated) DEVICE — DRAPE TOWEL BLUE 17" X 24"